# Patient Record
Sex: FEMALE | Race: WHITE | Employment: OTHER | URBAN - METROPOLITAN AREA
[De-identification: names, ages, dates, MRNs, and addresses within clinical notes are randomized per-mention and may not be internally consistent; named-entity substitution may affect disease eponyms.]

---

## 2020-01-09 LAB
CHOLESTEROL, TOTAL: NORMAL
CHOLESTEROL/HDL RATIO: NORMAL
HDLC SERPL-MCNC: NORMAL MG/DL
LDL CHOLESTEROL CALCULATED: NORMAL
NONHDLC SERPL-MCNC: NORMAL MG/DL
TRIGL SERPL-MCNC: NORMAL MG/DL
VLDLC SERPL CALC-MCNC: NORMAL MG/DL

## 2020-01-16 ENCOUNTER — OFFICE VISIT (OUTPATIENT)
Dept: SURGERY | Age: 65
End: 2020-01-16
Payer: MEDICARE

## 2020-01-16 ENCOUNTER — PREP FOR PROCEDURE (OUTPATIENT)
Dept: SURGERY | Age: 65
End: 2020-01-16

## 2020-01-16 VITALS
OXYGEN SATURATION: 95 % | DIASTOLIC BLOOD PRESSURE: 86 MMHG | HEIGHT: 61 IN | TEMPERATURE: 97.6 F | SYSTOLIC BLOOD PRESSURE: 137 MMHG | WEIGHT: 205 LBS | BODY MASS INDEX: 38.71 KG/M2 | HEART RATE: 72 BPM | RESPIRATION RATE: 20 BRPM

## 2020-01-16 PROCEDURE — 1090F PRES/ABSN URINE INCON ASSESS: CPT | Performed by: SURGERY

## 2020-01-16 PROCEDURE — G8484 FLU IMMUNIZE NO ADMIN: HCPCS | Performed by: SURGERY

## 2020-01-16 PROCEDURE — 99214 OFFICE O/P EST MOD 30 MIN: CPT | Performed by: SURGERY

## 2020-01-16 PROCEDURE — G8427 DOCREV CUR MEDS BY ELIG CLIN: HCPCS | Performed by: SURGERY

## 2020-01-16 PROCEDURE — G8417 CALC BMI ABV UP PARAM F/U: HCPCS | Performed by: SURGERY

## 2020-01-16 RX ORDER — INDOCYANINE GREEN AND WATER 25 MG
2.5 KIT INJECTION ONCE
Status: CANCELLED | OUTPATIENT
Start: 2020-01-16 | End: 2020-01-16

## 2020-01-16 RX ORDER — SODIUM CHLORIDE 0.9 % (FLUSH) 0.9 %
10 SYRINGE (ML) INJECTION PRN
Status: CANCELLED | OUTPATIENT
Start: 2020-01-16

## 2020-01-16 RX ORDER — SODIUM CHLORIDE 0.9 % (FLUSH) 0.9 %
10 SYRINGE (ML) INJECTION EVERY 12 HOURS SCHEDULED
Status: CANCELLED | OUTPATIENT
Start: 2020-01-16

## 2020-01-16 SDOH — HEALTH STABILITY: MENTAL HEALTH: HOW OFTEN DO YOU HAVE A DRINK CONTAINING ALCOHOL?: NEVER

## 2020-01-16 NOTE — PATIENT INSTRUCTIONS
General Surgery     Preoperative Instructions    Please read the following information very carefully. It contains information that is necessary to best prepare you for your upcoming procedure. Make arrangements for a  to take you to and from your procedure. Nothing to eat or drink after midnight the night before your procedure. Follow your bowel prep instructions if you have them for this procedure. 3 days prior to your procedure: Stop taking blood thinners like Coumadin or Plavix or Xarelto. 5 days prior to your procedure: Stop taking Aspirin or Aspirin containing products. If you cannot stop any of these medications prior to your procedure, please contact our office. Medications morning of procedure: Only heart, breathing, blood pressure, and seizure medications are permitted on the morning of your procedure. These medications can be taken with a sip of water. IF YOU ARE UNABLE TO KEEP THE ABOVE SCHEDULED PROCEDURE, YOU MUST NOTIFY DR. BUSTOS'S OFFICE [phone]. NOT THE FACILITY. NO CHEWING GUM OR CHEWING TOBACCO AFTER MIDNIGHT ON DAY OF PROCEDURE.    YOU MUST HAVE TRANSPORTATION TO AND FROM THE FACILITY.

## 2020-01-16 NOTE — PROGRESS NOTES
History and Physical - General Surgery    Patient's Name/Date of Birth: Jennifer Ivory / 1955    Date: 1/16/2020    PCP: Abhishek Horton MD    Referring Physician:   Eveline Montejo MD  [phone]      CHIEF COMPLAINT:    Chief Complaint   Patient presents with    Cholelithiasis     referral for gallstones on US         HISTORY OF PRESENT ILLNESS:    Jennifer Ivory is an 75 y.o. female who presents with RUQ pain, nausea after eating. She isn't sure what kinds of foods bother her. She thinks she has had about three attacks. This has been going on for a while. She said since her last attack she has stuck to a bland diet. Her last attack was about a week ago. She said the attack lasted 3-4 hours. No vomiting. She had some diarrhea as well. No cola colored urine. She thinks she has had some lighter stool. She had recent LFTs which were normal. Her two daughters and two of her sisters had gallbladder surgery. Past Medical History:   Past Medical History:   Diagnosis Date    Blood transfusion complicating pregnancy     Phlebitis         Past Surgical History:   Past Surgical History:   Procedure Laterality Date    ECTOPIC PREGNANCY SURGERY  1979    HYSTERECTOMY, VAGINAL      SPLENECTOMY, TOTAL  1979    TOE SURGERY  2000    TONSILLECTOMY          Allergies: Aspirin     Medications:   No current outpatient medications on file. No current facility-administered medications for this visit. Social History:   Social History     Tobacco Use    Smoking status: Never Smoker    Smokeless tobacco: Never Used   Substance Use Topics    Alcohol use: Never     Frequency: Never        Family History:   No family history on file.     REVIEW OF SYSTEMS:    Constitutional: negative  Eyes: negative  Ears, nose, mouth, throat, and face: negative  Respiratory: negative  Cardiovascular: negative  Gastrointestinal: as in HPI  Genitourinary:negative  Integument/breast: negative  Hematologic/lymphatic: negative  Musculoskeletal:negative  Neurological: negative  Allergic/Immunologic: negative    PHYSICAL EXAM   /86 (Site: Left Upper Arm, Position: Sitting, Cuff Size: Medium Adult)   Pulse 72   Temp 97.6 °F (36.4 °C) (Oral)   Resp 20   Ht 5' 1\" (1.549 m)   Wt 205 lb (93 kg)   SpO2 95%   BMI 38.73 kg/m²     General appearance: alert, cooperative and in no acute distress. Eyes: Grossly normal   Lungs: Clear to auscultation bilaterally  Heart: regular rate and rhythm  Abdomen:epigastric tenderness, soft, non distended, no masses or organomegaly   Skin: No skin abnormalities  Neurologic: Alert and oriented x 3. Grossly normal  Musculoskeletal: No clubbing cyanosis or edema. DATA:          ASSESSMENT AND PLAN:       Assessment: Yair Olivia is an 75 y.o. female who presents with cholelithiasis, biliary colic    Plan: Robotic assisted laparoscopic possible open cholecystectomy possible intraoperative cholangiogram  Risks of cholecystectomy were discussed with the patient. These include but are not limited to common bile duct injury, bile leak, liver injury, damage to blood vessels and bleeding, injury to bowel with port placement, as well as infection in the abdomen or of the incisions. I explained all other risks, benefits, alternatives, and potential complications associated with the procedure to be performed and transfusions when applicable with the patient/responsible person prior to the procedure. All of the patient's questions were answered. The patient understands and agrees to surgery.      Physician Signature: Electronically signed by Lea Grant MD, General Surgery    Send copy of H&P to PCP, Jean Pierre Mcleod MD and referring physician, Prasanth Don MD

## 2020-01-22 NOTE — PROGRESS NOTES
Pt has medicare no prior auth is needed for lap aaron.     Electronically signed by Willow Sidhu MA on 1/22/2020 at 12:12 PM

## 2020-01-24 NOTE — PROGRESS NOTES
[facility] PRE-ADMISSION TESTING INSTRUCTIONS    The Preadmission Testing patient is instructed accordingly using the following criteria (check applicable):    ARRIVAL INSTRUCTIONS:  [x] Parking the day of Surgery is located in the Main Entrance lot. Upon entering the door, make an immediate right to the surgery reception desk    [x] Bring photo ID and insurance card    [] Bring in a copy of Living will or Durable Power of  papers. [x] Please be sure to arrange for responsible adult to provide transportation to and from the hospital    [x] Please arrange for responsible adult to be with you for the 24 hour period post procedure due to having anesthesia      GENERAL INSTRUCTIONS:    [x] Nothing by mouth after midnight, including gum, candy, mints or water    [x] You may brush your teeth, but do not swallow any water    [] Take medications as instructed with 1-2 oz of water    [x] Stop herbal supplements and vitamins 5 days prior to procedure    [x] Follow preop dosing of blood thinners per physician instructions    [] Take 1/2 dose of evening insulin, but no insulin after midnight    [] No oral diabetic medications after midnight    [] If diabetic and have low blood sugar or feel symptomatic, take 1-2oz apple juice only    [] Bring inhalers day of surgery    [] Bring C-PAP/ Bi-Pap day of surgery    [] Bring urine specimen day of surgery    [x] Shower or bath with soap, lather and rinse well, AM of Surgery, no lotion, powders or creams to surgical site    [] Follow bowel prep as instructed per surgeon    [x] No tobacco products within 24 hours of surgery     [x] No alcohol or illegal drug use within 24 hours of surgery.     [x] Jewelry, body piercing's, eyeglasses, contact lenses and dentures are not permitted into surgery (bring cases)      [x] Please do not wear any nail polish, make up or hair products on the day of surgery    [x] If not already done, you can expect a call from registration    [x] You can expect a call the business day prior to procedure to notify you if your arrival time changes    [x] If you receive a survey after surgery we would greatly appreciate your comments    [] Parent/guardian of a minor must accompany their child and remain on the premises  the entire time they are under our care     [] Pediatric patients may bring favorite toy, blanket or comfort item with them    [] A caregiver or family member must remain with the patient during their stay if they are mentally handicapped, have dementia, disoriented or unable to use a call light or would be a safety concern if left unattended    [x] Please notify surgeon if you develop any illness between now and time of surgery (cold, cough, sore throat, fever, nausea, vomiting) or any signs of infections  including skin, wounds, and dental.    [x]  The Outpatient Pharmacy is available to fill your prescription here on your day of surgery, ask your preop nurse for details    [] Other instructions  EDUCATIONAL MATERIALS PROVIDED:    [] PAT Preoperative Education Packet/Booklet     [] Medication List    [] Fluoroscopy Information Pamphlet    [] Transfusion bracelet applied with instructions    [] Joint replacement video reviewed    [] Shower with soap, lather and rinse well, and use CHG wipes provided the evening before surgery as instructed

## 2020-02-04 ENCOUNTER — HOSPITAL ENCOUNTER (OUTPATIENT)
Age: 65
Setting detail: OUTPATIENT SURGERY
Discharge: HOME | End: 2020-02-04
Attending: SURGERY | Admitting: SURGERY
Payer: MEDICARE

## 2020-02-04 ENCOUNTER — ANESTHESIA EVENT (OUTPATIENT)
Dept: OPERATING ROOM | Age: 65
End: 2020-02-04
Payer: MEDICARE

## 2020-02-04 ENCOUNTER — ANESTHESIA (OUTPATIENT)
Dept: OPERATING ROOM | Age: 65
End: 2020-02-04
Payer: MEDICARE

## 2020-02-04 VITALS
DIASTOLIC BLOOD PRESSURE: 67 MMHG | HEART RATE: 61 BPM | RESPIRATION RATE: 16 BRPM | WEIGHT: 205 LBS | HEIGHT: 61 IN | BODY MASS INDEX: 38.71 KG/M2 | OXYGEN SATURATION: 94 % | SYSTOLIC BLOOD PRESSURE: 141 MMHG | TEMPERATURE: 97.7 F

## 2020-02-04 VITALS
SYSTOLIC BLOOD PRESSURE: 171 MMHG | DIASTOLIC BLOOD PRESSURE: 99 MMHG | OXYGEN SATURATION: 100 % | RESPIRATION RATE: 10 BRPM | TEMPERATURE: 96.1 F

## 2020-02-04 LAB
ALBUMIN SERPL-MCNC: 3.7 G/DL (ref 3.5–5.2)
ALP BLD-CCNC: 80 U/L (ref 35–104)
ALT SERPL-CCNC: 20 U/L (ref 0–32)
ANION GAP SERPL CALCULATED.3IONS-SCNC: 8 MMOL/L (ref 7–16)
AST SERPL-CCNC: 26 U/L (ref 0–31)
BILIRUB SERPL-MCNC: 0.6 MG/DL (ref 0–1.2)
BUN BLDV-MCNC: 7 MG/DL (ref 8–23)
CALCIUM SERPL-MCNC: 9.3 MG/DL (ref 8.6–10.2)
CHLORIDE BLD-SCNC: 106 MMOL/L (ref 98–107)
CO2: 24 MMOL/L (ref 22–29)
CREAT SERPL-MCNC: 0.8 MG/DL (ref 0.5–1)
GFR AFRICAN AMERICAN: >60
GFR NON-AFRICAN AMERICAN: >60 ML/MIN/1.73
GLUCOSE BLD-MCNC: 96 MG/DL (ref 74–99)
POTASSIUM REFLEX MAGNESIUM: 4.5 MMOL/L (ref 3.5–5)
SODIUM BLD-SCNC: 138 MMOL/L (ref 132–146)
TOTAL PROTEIN: 7.5 G/DL (ref 6.4–8.3)

## 2020-02-04 PROCEDURE — 2500000003 HC RX 250 WO HCPCS: Performed by: NURSE ANESTHETIST, CERTIFIED REGISTERED

## 2020-02-04 PROCEDURE — 80053 COMPREHEN METABOLIC PANEL: CPT

## 2020-02-04 PROCEDURE — 88304 TISSUE EXAM BY PATHOLOGIST: CPT

## 2020-02-04 PROCEDURE — 2709999900 HC NON-CHARGEABLE SUPPLY: Performed by: SURGERY

## 2020-02-04 PROCEDURE — 6360000002 HC RX W HCPCS: Performed by: NURSE ANESTHETIST, CERTIFIED REGISTERED

## 2020-02-04 PROCEDURE — 7100000010 HC PHASE II RECOVERY - FIRST 15 MIN: Performed by: SURGERY

## 2020-02-04 PROCEDURE — 6360000002 HC RX W HCPCS: Performed by: SURGERY

## 2020-02-04 PROCEDURE — 2500000003 HC RX 250 WO HCPCS: Performed by: SURGERY

## 2020-02-04 PROCEDURE — 7100000001 HC PACU RECOVERY - ADDTL 15 MIN: Performed by: SURGERY

## 2020-02-04 PROCEDURE — 7100000011 HC PHASE II RECOVERY - ADDTL 15 MIN: Performed by: SURGERY

## 2020-02-04 PROCEDURE — 3600000019 HC SURGERY ROBOT ADDTL 15MIN: Performed by: SURGERY

## 2020-02-04 PROCEDURE — 3600000009 HC SURGERY ROBOT BASE: Performed by: SURGERY

## 2020-02-04 PROCEDURE — 3700000000 HC ANESTHESIA ATTENDED CARE: Performed by: SURGERY

## 2020-02-04 PROCEDURE — 3700000001 HC ADD 15 MINUTES (ANESTHESIA): Performed by: SURGERY

## 2020-02-04 PROCEDURE — S2900 ROBOTIC SURGICAL SYSTEM: HCPCS | Performed by: SURGERY

## 2020-02-04 PROCEDURE — 47562 LAPAROSCOPIC CHOLECYSTECTOMY: CPT | Performed by: SURGERY

## 2020-02-04 PROCEDURE — 7100000000 HC PACU RECOVERY - FIRST 15 MIN: Performed by: SURGERY

## 2020-02-04 PROCEDURE — 2580000003 HC RX 258: Performed by: NURSE ANESTHETIST, CERTIFIED REGISTERED

## 2020-02-04 PROCEDURE — 36415 COLL VENOUS BLD VENIPUNCTURE: CPT

## 2020-02-04 PROCEDURE — 6360000002 HC RX W HCPCS: Performed by: ANESTHESIOLOGY

## 2020-02-04 RX ORDER — INDOCYANINE GREEN AND WATER 25 MG
2.5 KIT INJECTION ONCE
Status: COMPLETED | OUTPATIENT
Start: 2020-02-04 | End: 2020-02-04

## 2020-02-04 RX ORDER — OXYCODONE HYDROCHLORIDE AND ACETAMINOPHEN 5; 325 MG/1; MG/1
1 TABLET ORAL
Status: DISCONTINUED | OUTPATIENT
Start: 2020-02-04 | End: 2020-02-04 | Stop reason: HOSPADM

## 2020-02-04 RX ORDER — PROMETHAZINE HYDROCHLORIDE 25 MG/ML
6.25 INJECTION, SOLUTION INTRAMUSCULAR; INTRAVENOUS PRN
Status: DISCONTINUED | OUTPATIENT
Start: 2020-02-04 | End: 2020-02-04 | Stop reason: HOSPADM

## 2020-02-04 RX ORDER — ROCURONIUM BROMIDE 10 MG/ML
INJECTION, SOLUTION INTRAVENOUS PRN
Status: DISCONTINUED | OUTPATIENT
Start: 2020-02-04 | End: 2020-02-04 | Stop reason: SDUPTHER

## 2020-02-04 RX ORDER — PROPOFOL 10 MG/ML
INJECTION, EMULSION INTRAVENOUS PRN
Status: DISCONTINUED | OUTPATIENT
Start: 2020-02-04 | End: 2020-02-04 | Stop reason: SDUPTHER

## 2020-02-04 RX ORDER — MIDAZOLAM HYDROCHLORIDE 1 MG/ML
INJECTION INTRAMUSCULAR; INTRAVENOUS PRN
Status: DISCONTINUED | OUTPATIENT
Start: 2020-02-04 | End: 2020-02-04 | Stop reason: SDUPTHER

## 2020-02-04 RX ORDER — MORPHINE SULFATE 4 MG/ML
4 INJECTION, SOLUTION INTRAMUSCULAR; INTRAVENOUS EVERY 5 MIN PRN
Status: COMPLETED | OUTPATIENT
Start: 2020-02-04 | End: 2020-02-04

## 2020-02-04 RX ORDER — SODIUM CHLORIDE 9 MG/ML
INJECTION, SOLUTION INTRAVENOUS CONTINUOUS PRN
Status: DISCONTINUED | OUTPATIENT
Start: 2020-02-04 | End: 2020-02-04 | Stop reason: SDUPTHER

## 2020-02-04 RX ORDER — DEXAMETHASONE SODIUM PHOSPHATE 4 MG/ML
INJECTION, SOLUTION INTRA-ARTICULAR; INTRALESIONAL; INTRAMUSCULAR; INTRAVENOUS; SOFT TISSUE PRN
Status: DISCONTINUED | OUTPATIENT
Start: 2020-02-04 | End: 2020-02-04 | Stop reason: SDUPTHER

## 2020-02-04 RX ORDER — SODIUM CHLORIDE 0.9 % (FLUSH) 0.9 %
10 SYRINGE (ML) INJECTION PRN
Status: DISCONTINUED | OUTPATIENT
Start: 2020-02-04 | End: 2020-02-04 | Stop reason: HOSPADM

## 2020-02-04 RX ORDER — SODIUM CHLORIDE 0.9 % (FLUSH) 0.9 %
10 SYRINGE (ML) INJECTION EVERY 12 HOURS SCHEDULED
Status: DISCONTINUED | OUTPATIENT
Start: 2020-02-04 | End: 2020-02-04 | Stop reason: HOSPADM

## 2020-02-04 RX ORDER — FENTANYL CITRATE 50 UG/ML
INJECTION, SOLUTION INTRAMUSCULAR; INTRAVENOUS PRN
Status: DISCONTINUED | OUTPATIENT
Start: 2020-02-04 | End: 2020-02-04 | Stop reason: SDUPTHER

## 2020-02-04 RX ORDER — ONDANSETRON 2 MG/ML
INJECTION INTRAMUSCULAR; INTRAVENOUS PRN
Status: DISCONTINUED | OUTPATIENT
Start: 2020-02-04 | End: 2020-02-04 | Stop reason: SDUPTHER

## 2020-02-04 RX ORDER — MEPERIDINE HYDROCHLORIDE 25 MG/ML
12.5 INJECTION INTRAMUSCULAR; INTRAVENOUS; SUBCUTANEOUS EVERY 10 MIN PRN
Status: DISCONTINUED | OUTPATIENT
Start: 2020-02-04 | End: 2020-02-04 | Stop reason: HOSPADM

## 2020-02-04 RX ORDER — CEFAZOLIN SODIUM 2 G/50ML
2 SOLUTION INTRAVENOUS
Status: COMPLETED | OUTPATIENT
Start: 2020-02-04 | End: 2020-02-04

## 2020-02-04 RX ORDER — HYDROCODONE BITARTRATE AND ACETAMINOPHEN 5; 325 MG/1; MG/1
1 TABLET ORAL EVERY 4 HOURS PRN
Qty: 18 TABLET | Refills: 0 | Status: SHIPPED | OUTPATIENT
Start: 2020-02-04 | End: 2020-02-07

## 2020-02-04 RX ORDER — LIDOCAINE HYDROCHLORIDE 20 MG/ML
INJECTION, SOLUTION EPIDURAL; INFILTRATION; INTRACAUDAL; PERINEURAL PRN
Status: DISCONTINUED | OUTPATIENT
Start: 2020-02-04 | End: 2020-02-04 | Stop reason: SDUPTHER

## 2020-02-04 RX ADMIN — FENTANYL CITRATE 25 MCG: 50 INJECTION, SOLUTION INTRAMUSCULAR; INTRAVENOUS at 09:28

## 2020-02-04 RX ADMIN — ROCURONIUM BROMIDE 50 MG: 10 SOLUTION INTRAVENOUS at 08:09

## 2020-02-04 RX ADMIN — LIDOCAINE HYDROCHLORIDE 100 MG: 20 INJECTION, SOLUTION EPIDURAL; INFILTRATION; INTRACAUDAL; PERINEURAL at 08:08

## 2020-02-04 RX ADMIN — SODIUM CHLORIDE: 9 INJECTION, SOLUTION INTRAVENOUS at 09:24

## 2020-02-04 RX ADMIN — ONDANSETRON HYDROCHLORIDE 4 MG: 2 INJECTION, SOLUTION INTRAMUSCULAR; INTRAVENOUS at 09:10

## 2020-02-04 RX ADMIN — MIDAZOLAM 2 MG: 1 INJECTION INTRAMUSCULAR; INTRAVENOUS at 07:54

## 2020-02-04 RX ADMIN — SODIUM CHLORIDE: 9 INJECTION, SOLUTION INTRAVENOUS at 07:54

## 2020-02-04 RX ADMIN — INDOCYANINE GREEN AND WATER 2.5 MG: KIT at 07:08

## 2020-02-04 RX ADMIN — FENTANYL CITRATE 100 MCG: 50 INJECTION, SOLUTION INTRAMUSCULAR; INTRAVENOUS at 08:08

## 2020-02-04 RX ADMIN — PROMETHAZINE HYDROCHLORIDE 6.25 MG: 25 INJECTION INTRAMUSCULAR; INTRAVENOUS at 10:06

## 2020-02-04 RX ADMIN — PROPOFOL 200 MG: 10 INJECTION, EMULSION INTRAVENOUS at 08:08

## 2020-02-04 RX ADMIN — DEXAMETHASONE SODIUM PHOSPHATE 10 MG: 4 INJECTION, SOLUTION INTRAMUSCULAR; INTRAVENOUS at 08:15

## 2020-02-04 RX ADMIN — HYDROMORPHONE HYDROCHLORIDE 0.5 MG: 1 INJECTION, SOLUTION INTRAMUSCULAR; INTRAVENOUS; SUBCUTANEOUS at 09:52

## 2020-02-04 RX ADMIN — MORPHINE SULFATE 4 MG: 4 INJECTION, SOLUTION INTRAMUSCULAR; INTRAVENOUS at 09:41

## 2020-02-04 RX ADMIN — SUGAMMADEX 372 MG: 100 INJECTION, SOLUTION INTRAVENOUS at 09:14

## 2020-02-04 RX ADMIN — FENTANYL CITRATE 25 MCG: 50 INJECTION, SOLUTION INTRAMUSCULAR; INTRAVENOUS at 08:56

## 2020-02-04 RX ADMIN — FENTANYL CITRATE 25 MCG: 50 INJECTION, SOLUTION INTRAMUSCULAR; INTRAVENOUS at 09:20

## 2020-02-04 RX ADMIN — FENTANYL CITRATE 50 MCG: 50 INJECTION, SOLUTION INTRAMUSCULAR; INTRAVENOUS at 08:32

## 2020-02-04 RX ADMIN — FENTANYL CITRATE 25 MCG: 50 INJECTION, SOLUTION INTRAMUSCULAR; INTRAVENOUS at 09:04

## 2020-02-04 RX ADMIN — CEFAZOLIN SODIUM 2 G: 2 SOLUTION INTRAVENOUS at 07:54

## 2020-02-04 ASSESSMENT — PULMONARY FUNCTION TESTS
PIF_VALUE: 1
PIF_VALUE: 20
PIF_VALUE: 0
PIF_VALUE: 0
PIF_VALUE: 10
PIF_VALUE: 0
PIF_VALUE: 35
PIF_VALUE: 29
PIF_VALUE: 36
PIF_VALUE: 0
PIF_VALUE: 35
PIF_VALUE: 34
PIF_VALUE: 0
PIF_VALUE: 28
PIF_VALUE: 34
PIF_VALUE: 25
PIF_VALUE: 29
PIF_VALUE: 30
PIF_VALUE: 35
PIF_VALUE: 17
PIF_VALUE: 0
PIF_VALUE: 33
PIF_VALUE: 0
PIF_VALUE: 27
PIF_VALUE: 25
PIF_VALUE: 0
PIF_VALUE: 35
PIF_VALUE: 0
PIF_VALUE: 33
PIF_VALUE: 34
PIF_VALUE: 0
PIF_VALUE: 34
PIF_VALUE: 0
PIF_VALUE: 35
PIF_VALUE: 27
PIF_VALUE: 33
PIF_VALUE: 40
PIF_VALUE: 36
PIF_VALUE: 33
PIF_VALUE: 34
PIF_VALUE: 33
PIF_VALUE: 0
PIF_VALUE: 0
PIF_VALUE: 1
PIF_VALUE: 0
PIF_VALUE: 34
PIF_VALUE: 0
PIF_VALUE: 35
PIF_VALUE: 0
PIF_VALUE: 25
PIF_VALUE: 36
PIF_VALUE: 0
PIF_VALUE: 26
PIF_VALUE: 1
PIF_VALUE: 35
PIF_VALUE: 35
PIF_VALUE: 28
PIF_VALUE: 35
PIF_VALUE: 33
PIF_VALUE: 18
PIF_VALUE: 0
PIF_VALUE: 34
PIF_VALUE: 32
PIF_VALUE: 0
PIF_VALUE: 35
PIF_VALUE: 0
PIF_VALUE: 0
PIF_VALUE: 25
PIF_VALUE: 0
PIF_VALUE: 33
PIF_VALUE: 24
PIF_VALUE: 35
PIF_VALUE: 19
PIF_VALUE: 0
PIF_VALUE: 34
PIF_VALUE: 32
PIF_VALUE: 0
PIF_VALUE: 0
PIF_VALUE: 1
PIF_VALUE: 35
PIF_VALUE: 25
PIF_VALUE: 35
PIF_VALUE: 0
PIF_VALUE: 33
PIF_VALUE: 0
PIF_VALUE: 32
PIF_VALUE: 0
PIF_VALUE: 0
PIF_VALUE: 34
PIF_VALUE: 26
PIF_VALUE: 31
PIF_VALUE: 1
PIF_VALUE: 25
PIF_VALUE: 0
PIF_VALUE: 0
PIF_VALUE: 28
PIF_VALUE: 0
PIF_VALUE: 36
PIF_VALUE: 18
PIF_VALUE: 28
PIF_VALUE: 1

## 2020-02-04 ASSESSMENT — PAIN DESCRIPTION - ORIENTATION
ORIENTATION: RIGHT;MID
ORIENTATION: LOWER;MID
ORIENTATION: MID;LOWER

## 2020-02-04 ASSESSMENT — PAIN DESCRIPTION - PAIN TYPE
TYPE: SURGICAL PAIN

## 2020-02-04 ASSESSMENT — PAIN DESCRIPTION - FREQUENCY
FREQUENCY: CONTINUOUS

## 2020-02-04 ASSESSMENT — PAIN SCALES - GENERAL
PAINLEVEL_OUTOF10: 8

## 2020-02-04 ASSESSMENT — PAIN DESCRIPTION - LOCATION
LOCATION: ABDOMEN

## 2020-02-04 ASSESSMENT — PAIN DESCRIPTION - DESCRIPTORS
DESCRIPTORS: ACHING;BURNING
DESCRIPTORS: ACHING;BURNING;DISCOMFORT
DESCRIPTORS: BURNING;ACHING
DESCRIPTORS: ACHING;BURNING
DESCRIPTORS: ACHING
DESCRIPTORS: BURNING

## 2020-02-04 ASSESSMENT — PAIN DESCRIPTION - PROGRESSION
CLINICAL_PROGRESSION: NOT CHANGED

## 2020-02-04 NOTE — PROGRESS NOTES
After being medicated for pain, patient fell asleep. Oxygen saturation dropped momentarily. Pt aroused to voice and deep breathing encouraged. Pt immediately improved with breathing exercises.

## 2020-02-04 NOTE — OP NOTE
Operative Note - Robotic Assisted Laparoscopic Cholecystectomy    Antoinette Fletcher     DATE OF PROCEDURE: 2/4/2020    SURGEON: Surgeon(s):  Helen Issa MD    PREOPERATIVE DIAGNOSIS: Biliary colic, gallstones    POSTOPERATIVE DIAGNOSIS: Acute cholecystitis     OPERATION: Robotic Assisted Laparoscopic cholecystectomy. ANESTHESIA: General endotracheal.     ESTIMATED BLOOD LOSS: less than 50ml    SPECIMEN: Gallbladder    COMPLICATIONS: None. BRIEF HISTORY: Antoinette Fletcher is a 75 y.o.  female who presented with RUQ pain. I discussed the procedure with the patient, including the procedure, benefits, risks, and alternatives. She agreed. ICG was given in preoperative holding prior to the procedure. PROCEDURE: The patient was taken to the operative suite and was placed on the table in the supine position. General endotracheal anesthesia was administered. An orogastric tube was placed to decompress the stomach. The abdomen was then prepped with Chloraprep and draped in a sterile fashion. An 8 mm periumbilical incision was made with an 11-blade scalpel, and then while tenting the abdominal wall upward, a Veress needle was easily inserted through the abdominal cavity. After confirmation of its placement using the saline drop test, a total of approximately 3 L of carbon dioxide was insufflated, creating a pneumoperitoneum. The Veress needle was removed, and an 8 mm trocar was inserted while tenting the abdominal wall upward. A prepared laparoscope was inserted, and the right upper quadrant was visualized. An 8-mm port was placed in the left upper quadrant, another two 8 mm ports were placed in the RLQ. There was no injury from port placement. The robot was then placed over the patient and the ports docked. I then broke scrub and began the case at the surgeon console. The robotic scope was introduced into the abdomen and two Cadiere graspers were placed in arms 1 and 2 under direct vision. allowed to escape. All skin incisions were irrigated with saline and dried, and any bleeding points were cauterized. All skin incisions were closed with 4-0 Monocryl subcuticular sutures. Skin glue was placed over all incisions. The patient tolerated the procedure well. Sponge, needle, and instrument counts were correct. The orogastric tube was replaced, and the patient was extubated and sent to the postanesthesia care unit in stable condition.      Lisa Spence MD, FACS 2/4/2020 9:14 AM    Send copy of H&P to PCP, Karen Perry MD and referring physician, Deidra Cabot, MD

## 2020-02-04 NOTE — ANESTHESIA PRE PROCEDURE
Department of Anesthesiology  Preprocedure Note       Name:  Loreli Closs   Age:  75 y.o.  :  1955                                          MRN:  [id number]         Date:  2020      Surgeon: Itzel Hernandez):  Lety Laura MD    Procedure: LAPAROSCOPIC ROBOTIC XI ASSISTED CHOLECYSTECTOMY POSSIBLE OPEN POSSIBLE GRAM    ++LATEX ALLERGY++ (N/A )    Medications prior to admission:   Prior to Admission medications    Not on File       Current medications:    Current Facility-Administered Medications   Medication Dose Route Frequency Provider Last Rate Last Dose    ceFAZolin (ANCEF) 2 g in dextrose 3 % 50 mL IVPB (duplex)  2 g Intravenous On Call to Félix Concepcion MD        sodium chloride flush 0.9 % injection 10 mL  10 mL Intravenous 2 times per day Lety Laura MD        sodium chloride flush 0.9 % injection 10 mL  10 mL Intravenous PRN Lety Laura MD           Allergies: Allergies   Allergen Reactions    Latex     Aspirin Nausea And Vomiting and Other (See Comments)     Stomach pain       Problem List:  There is no problem list on file for this patient.       Past Medical History:        Diagnosis Date    Cyst of left kidney     x 2    Diverticulitis     Gallbladder problem 2020    History of blood transfusion 40 years ago    Phlebitis 40 years ago    while hospitalized    PONV (postoperative nausea and vomiting)        Past Surgical History:        Procedure Laterality Date    COLONOSCOPY     [address], VAGINAL  2012    SPLENECTOMY, TOTAL  1979    TOE SURGERY      TONSILLECTOMY         Social History:    Social History     Tobacco Use    Smoking status: Never Smoker    Smokeless tobacco: Never Used   Substance Use Topics    Alcohol use: Never     Frequency: Never                                Counseling given: Not Answered      Vital Signs (Current):   Vitals:    20 1529 20 0631   BP:

## 2020-02-04 NOTE — H&P
clubbing cyanosis or edema. DATA:          ASSESSMENT AND PLAN:       Assessment: Suzanne Moritz is an 75 y.o. female who presents with cholelithiasis, biliary colic    Plan: Robotic assisted laparoscopic possible open cholecystectomy possible intraoperative cholangiogram  Risks of cholecystectomy were discussed with the patient. These include but are not limited to common bile duct injury, bile leak, liver injury, damage to blood vessels and bleeding, injury to bowel with port placement, as well as infection in the abdomen or of the incisions. I explained all other risks, benefits, alternatives, and potential complications associated with the procedure to be performed and transfusions when applicable with the patient/responsible person prior to the procedure. All of the patient's questions were answered. The patient understands and agrees to surgery.      Physician Signature: Electronically signed by Bren Hinson MD, General Surgery    Send copy of H&P to PCP, Elizabeth Hassan MD and referring physician, Bar Serna MD

## 2020-02-17 ENCOUNTER — TELEPHONE (OUTPATIENT)
Dept: SURGERY | Age: 65
End: 2020-02-17

## 2020-02-17 ENCOUNTER — OFFICE VISIT (OUTPATIENT)
Dept: SURGERY | Age: 65
End: 2020-02-17

## 2020-02-17 VITALS
HEART RATE: 81 BPM | SYSTOLIC BLOOD PRESSURE: 128 MMHG | WEIGHT: 204 LBS | BODY MASS INDEX: 38.51 KG/M2 | DIASTOLIC BLOOD PRESSURE: 80 MMHG | RESPIRATION RATE: 20 BRPM | HEIGHT: 61 IN | TEMPERATURE: 97.8 F | OXYGEN SATURATION: 94 %

## 2020-02-17 PROCEDURE — 99024 POSTOP FOLLOW-UP VISIT: CPT | Performed by: SURGERY

## 2021-01-28 ENCOUNTER — APPOINTMENT (OUTPATIENT)
Dept: CT IMAGING | Age: 66
DRG: 280 | End: 2021-01-28
Payer: MEDICARE

## 2021-01-28 ENCOUNTER — HOSPITAL ENCOUNTER (INPATIENT)
Age: 66
LOS: 1 days | Discharge: ANOTHER HOSPITAL | DRG: 280 | End: 2021-01-29
Attending: EMERGENCY MEDICINE | Admitting: INTERNAL MEDICINE
Payer: MEDICARE

## 2021-01-28 DIAGNOSIS — I21.4 NSTEMI (NON-ST ELEVATED MYOCARDIAL INFARCTION) (HCC): Primary | ICD-10-CM

## 2021-01-28 LAB
ALBUMIN SERPL-MCNC: 3.7 G/DL (ref 3.5–5.2)
ALP BLD-CCNC: 107 U/L (ref 35–104)
ALT SERPL-CCNC: 25 U/L (ref 0–32)
ANION GAP SERPL CALCULATED.3IONS-SCNC: 9 MMOL/L (ref 7–16)
APTT: 30.1 SEC (ref 24.5–35.1)
AST SERPL-CCNC: 47 U/L (ref 0–31)
BASOPHILS ABSOLUTE: 0.05 E9/L (ref 0–0.2)
BASOPHILS RELATIVE PERCENT: 0.5 % (ref 0–2)
BILIRUB SERPL-MCNC: 0.4 MG/DL (ref 0–1.2)
BUN BLDV-MCNC: 8 MG/DL (ref 8–23)
CALCIUM SERPL-MCNC: 9.2 MG/DL (ref 8.6–10.2)
CHLORIDE BLD-SCNC: 106 MMOL/L (ref 98–107)
CO2: 26 MMOL/L (ref 22–29)
CREAT SERPL-MCNC: 0.7 MG/DL (ref 0.5–1)
EOSINOPHILS ABSOLUTE: 0.02 E9/L (ref 0.05–0.5)
EOSINOPHILS RELATIVE PERCENT: 0.2 % (ref 0–6)
GFR AFRICAN AMERICAN: >60
GFR NON-AFRICAN AMERICAN: >60 ML/MIN/1.73
GLUCOSE BLD-MCNC: 98 MG/DL (ref 74–99)
HCT VFR BLD CALC: 45.5 % (ref 34–48)
HEMOGLOBIN: 15.1 G/DL (ref 11.5–15.5)
IMMATURE GRANULOCYTES #: 0.04 E9/L
IMMATURE GRANULOCYTES %: 0.4 % (ref 0–5)
INR BLD: 1
LACTIC ACID: 1.5 MMOL/L (ref 0.5–2.2)
LIPASE: 29 U/L (ref 13–60)
LYMPHOCYTES ABSOLUTE: 2.77 E9/L (ref 1.5–4)
LYMPHOCYTES RELATIVE PERCENT: 26 % (ref 20–42)
MCH RBC QN AUTO: 30.6 PG (ref 26–35)
MCHC RBC AUTO-ENTMCNC: 33.2 % (ref 32–34.5)
MCV RBC AUTO: 92.1 FL (ref 80–99.9)
MONOCYTES ABSOLUTE: 1.17 E9/L (ref 0.1–0.95)
MONOCYTES RELATIVE PERCENT: 11 % (ref 2–12)
NEUTROPHILS ABSOLUTE: 6.61 E9/L (ref 1.8–7.3)
NEUTROPHILS RELATIVE PERCENT: 61.9 % (ref 43–80)
PDW BLD-RTO: 14.5 FL (ref 11.5–15)
PLATELET # BLD: 421 E9/L (ref 130–450)
PMV BLD AUTO: 9.7 FL (ref 7–12)
POTASSIUM REFLEX MAGNESIUM: 4.2 MMOL/L (ref 3.5–5)
PROTHROMBIN TIME: 11.6 SEC (ref 9.3–12.4)
RBC # BLD: 4.94 E12/L (ref 3.5–5.5)
SODIUM BLD-SCNC: 141 MMOL/L (ref 132–146)
TOTAL PROTEIN: 7.4 G/DL (ref 6.4–8.3)
TROPONIN: 0.32 NG/ML (ref 0–0.03)
TROPONIN: 0.62 NG/ML (ref 0–0.03)
WBC # BLD: 10.7 E9/L (ref 4.5–11.5)

## 2021-01-28 PROCEDURE — 85025 COMPLETE CBC W/AUTO DIFF WBC: CPT

## 2021-01-28 PROCEDURE — 36415 COLL VENOUS BLD VENIPUNCTURE: CPT

## 2021-01-28 PROCEDURE — 1200000000 HC SEMI PRIVATE

## 2021-01-28 PROCEDURE — 2580000003 HC RX 258: Performed by: INTERNAL MEDICINE

## 2021-01-28 PROCEDURE — 83690 ASSAY OF LIPASE: CPT

## 2021-01-28 PROCEDURE — 99283 EMERGENCY DEPT VISIT LOW MDM: CPT

## 2021-01-28 PROCEDURE — 85610 PROTHROMBIN TIME: CPT

## 2021-01-28 PROCEDURE — 6360000004 HC RX CONTRAST MEDICATION: Performed by: RADIOLOGY

## 2021-01-28 PROCEDURE — 93005 ELECTROCARDIOGRAM TRACING: CPT | Performed by: INTERNAL MEDICINE

## 2021-01-28 PROCEDURE — 84484 ASSAY OF TROPONIN QUANT: CPT

## 2021-01-28 PROCEDURE — 2580000003 HC RX 258: Performed by: EMERGENCY MEDICINE

## 2021-01-28 PROCEDURE — 71275 CT ANGIOGRAPHY CHEST: CPT

## 2021-01-28 PROCEDURE — 85730 THROMBOPLASTIN TIME PARTIAL: CPT

## 2021-01-28 PROCEDURE — 80053 COMPREHEN METABOLIC PANEL: CPT

## 2021-01-28 PROCEDURE — 6370000000 HC RX 637 (ALT 250 FOR IP): Performed by: EMERGENCY MEDICINE

## 2021-01-28 PROCEDURE — 6360000002 HC RX W HCPCS: Performed by: EMERGENCY MEDICINE

## 2021-01-28 PROCEDURE — 83605 ASSAY OF LACTIC ACID: CPT

## 2021-01-28 RX ORDER — SODIUM CHLORIDE 0.9 % (FLUSH) 0.9 %
10 SYRINGE (ML) INJECTION EVERY 12 HOURS SCHEDULED
Status: DISCONTINUED | OUTPATIENT
Start: 2021-01-28 | End: 2021-01-29 | Stop reason: HOSPADM

## 2021-01-28 RX ORDER — ASPIRIN 81 MG/1
324 TABLET, CHEWABLE ORAL ONCE
Status: COMPLETED | OUTPATIENT
Start: 2021-01-28 | End: 2021-01-28

## 2021-01-28 RX ORDER — 0.9 % SODIUM CHLORIDE 0.9 %
1000 INTRAVENOUS SOLUTION INTRAVENOUS ONCE
Status: COMPLETED | OUTPATIENT
Start: 2021-01-28 | End: 2021-01-28

## 2021-01-28 RX ORDER — ONDANSETRON 2 MG/ML
4 INJECTION INTRAMUSCULAR; INTRAVENOUS ONCE
Status: COMPLETED | OUTPATIENT
Start: 2021-01-28 | End: 2021-01-28

## 2021-01-28 RX ORDER — ACETAMINOPHEN 650 MG/1
650 SUPPOSITORY RECTAL EVERY 6 HOURS PRN
Status: DISCONTINUED | OUTPATIENT
Start: 2021-01-28 | End: 2021-01-29 | Stop reason: HOSPADM

## 2021-01-28 RX ORDER — POLYETHYLENE GLYCOL 3350 17 G/17G
17 POWDER, FOR SOLUTION ORAL DAILY PRN
Status: DISCONTINUED | OUTPATIENT
Start: 2021-01-28 | End: 2021-01-29 | Stop reason: HOSPADM

## 2021-01-28 RX ORDER — NITROGLYCERIN 0.4 MG/1
0.4 TABLET SUBLINGUAL EVERY 5 MIN PRN
Status: DISCONTINUED | OUTPATIENT
Start: 2021-01-28 | End: 2021-01-29 | Stop reason: HOSPADM

## 2021-01-28 RX ORDER — ACETAMINOPHEN 325 MG/1
650 TABLET ORAL EVERY 6 HOURS PRN
Status: DISCONTINUED | OUTPATIENT
Start: 2021-01-28 | End: 2021-01-29 | Stop reason: HOSPADM

## 2021-01-28 RX ORDER — SODIUM CHLORIDE 0.9 % (FLUSH) 0.9 %
10 SYRINGE (ML) INJECTION PRN
Status: DISCONTINUED | OUTPATIENT
Start: 2021-01-28 | End: 2021-01-29 | Stop reason: HOSPADM

## 2021-01-28 RX ADMIN — ENOXAPARIN SODIUM 90 MG: 100 INJECTION SUBCUTANEOUS at 19:28

## 2021-01-28 RX ADMIN — ASPIRIN 324 MG: 81 TABLET, CHEWABLE ORAL at 19:29

## 2021-01-28 RX ADMIN — ONDANSETRON 4 MG: 2 INJECTION INTRAMUSCULAR; INTRAVENOUS at 19:26

## 2021-01-28 RX ADMIN — IOPAMIDOL 75 ML: 755 INJECTION, SOLUTION INTRAVENOUS at 17:58

## 2021-01-28 RX ADMIN — SODIUM CHLORIDE 1000 ML: 9 INJECTION, SOLUTION INTRAVENOUS at 17:21

## 2021-01-28 RX ADMIN — METOPROLOL TARTRATE 25 MG: 25 TABLET, FILM COATED ORAL at 19:29

## 2021-01-28 RX ADMIN — Medication 10 ML: at 22:55

## 2021-01-28 RX ADMIN — LIDOCAINE HYDROCHLORIDE: 20 SOLUTION ORAL; TOPICAL at 19:29

## 2021-01-28 ASSESSMENT — ENCOUNTER SYMPTOMS
VOMITING: 0
SHORTNESS OF BREATH: 0
COUGH: 0
BACK PAIN: 0
NAUSEA: 0
ABDOMINAL DISTENTION: 0
BLOOD IN STOOL: 0
CONSTIPATION: 0
EYE REDNESS: 0
RHINORRHEA: 0
WHEEZING: 0
ABDOMINAL PAIN: 0
DIARRHEA: 0
SORE THROAT: 0

## 2021-01-28 NOTE — ED NOTES
EKG completed    Cardiac monitoring and continuous SpO2     Ami B [facility]  01/28/21 1145

## 2021-01-28 NOTE — Clinical Note
Patient Class: Inpatient [101]   REQUIRED: Diagnosis: NSTEMI (non-ST elevated myocardial infarction) [facility]) [196157]   Estimated Length of Stay: Estimated stay of more than 2 midnights   Admitting Provider: Lew Case [[id number]]

## 2021-01-29 ENCOUNTER — HOSPITAL ENCOUNTER (INPATIENT)
Age: 66
LOS: 1 days | Discharge: HOME | DRG: 247 | End: 2021-01-30
Attending: FAMILY MEDICINE | Admitting: EMERGENCY MEDICINE
Payer: MEDICARE

## 2021-01-29 VITALS
TEMPERATURE: 98.4 F | OXYGEN SATURATION: 94 % | BODY MASS INDEX: 39.27 KG/M2 | HEIGHT: 61 IN | HEART RATE: 69 BPM | DIASTOLIC BLOOD PRESSURE: 78 MMHG | WEIGHT: 208 LBS | RESPIRATION RATE: 18 BRPM | SYSTOLIC BLOOD PRESSURE: 122 MMHG

## 2021-01-29 DIAGNOSIS — I25.10 CAD IN NATIVE ARTERY: ICD-10-CM

## 2021-01-29 LAB
ABO/RH: NORMAL
ALBUMIN SERPL-MCNC: 3.4 G/DL (ref 3.5–5.2)
ALP BLD-CCNC: 100 U/L (ref 35–104)
ALT SERPL-CCNC: 34 U/L (ref 0–32)
ANION GAP SERPL CALCULATED.3IONS-SCNC: 7 MMOL/L (ref 7–16)
ANTIBODY IDENTIFICATION: NORMAL
ANTIBODY SCREEN: NORMAL
APTT: 55.3 SEC (ref 24.5–35.1)
AST SERPL-CCNC: 79 U/L (ref 0–31)
BILIRUB SERPL-MCNC: 0.5 MG/DL (ref 0–1.2)
BUN BLDV-MCNC: 7 MG/DL (ref 8–23)
CALCIUM SERPL-MCNC: 8.6 MG/DL (ref 8.6–10.2)
CHLORIDE BLD-SCNC: 104 MMOL/L (ref 98–107)
CHOLESTEROL, TOTAL: 177 MG/DL (ref 0–199)
CO2: 25 MMOL/L (ref 22–29)
CREAT SERPL-MCNC: 0.8 MG/DL (ref 0.5–1)
DAT POLYSPECIFIC: NORMAL
DR. NOTIFY: NORMAL
EKG ATRIAL RATE: 88 BPM
EKG P-R INTERVAL: 146 MS
EKG Q-T INTERVAL: 372 MS
EKG QRS DURATION: 104 MS
EKG QTC CALCULATION (BAZETT): 450 MS
EKG R AXIS: -25 DEGREES
EKG T AXIS: -11 DEGREES
EKG VENTRICULAR RATE: 88 BPM
GFR AFRICAN AMERICAN: >60
GFR NON-AFRICAN AMERICAN: >60 ML/MIN/1.73
GLUCOSE BLD-MCNC: 101 MG/DL (ref 74–99)
HBA1C MFR BLD: 5.8 % (ref 4–5.6)
HCT VFR BLD CALC: 39.9 % (ref 34–48)
HDLC SERPL-MCNC: 62 MG/DL
HEMOGLOBIN: 13.5 G/DL (ref 11.5–15.5)
LDL CHOLESTEROL CALCULATED: 97 MG/DL (ref 0–99)
LV EF: 63 %
LVEF MODALITY: NORMAL
MAGNESIUM: 2.2 MG/DL (ref 1.6–2.6)
MCH RBC QN AUTO: 30.5 PG (ref 26–35)
MCHC RBC AUTO-ENTMCNC: 33.8 % (ref 32–34.5)
MCV RBC AUTO: 90.3 FL (ref 80–99.9)
PDW BLD-RTO: 15 FL (ref 11.5–15)
PHOSPHORUS: 2.5 MG/DL (ref 2.5–4.5)
PLATELET # BLD: 366 E9/L (ref 130–450)
PMV BLD AUTO: 9.7 FL (ref 7–12)
POC ACT LR: 293 SECONDS
POTASSIUM SERPL-SCNC: 3.9 MMOL/L (ref 3.5–5)
RBC # BLD: 4.42 E12/L (ref 3.5–5.5)
SODIUM BLD-SCNC: 136 MMOL/L (ref 132–146)
TOTAL PROTEIN: 6.9 G/DL (ref 6.4–8.3)
TRIGL SERPL-MCNC: 89 MG/DL (ref 0–149)
TROPONIN: 0.82 NG/ML (ref 0–0.03)
TROPONIN: 2.04 NG/ML (ref 0–0.03)
TSH SERPL DL<=0.05 MIU/L-ACNC: 2.1 UIU/ML (ref 0.27–4.2)
VLDLC SERPL CALC-MCNC: 18 MG/DL
WBC # BLD: 12.1 E9/L (ref 4.5–11.5)

## 2021-01-29 PROCEDURE — 6360000002 HC RX W HCPCS: Performed by: STUDENT IN AN ORGANIZED HEALTH CARE EDUCATION/TRAINING PROGRAM

## 2021-01-29 PROCEDURE — 92928 PRQ TCAT PLMT NTRAC ST 1 LES: CPT | Performed by: INTERNAL MEDICINE

## 2021-01-29 PROCEDURE — 93458 L HRT ARTERY/VENTRICLE ANGIO: CPT | Performed by: INTERNAL MEDICINE

## 2021-01-29 PROCEDURE — 86880 COOMBS TEST DIRECT: CPT

## 2021-01-29 PROCEDURE — 6360000002 HC RX W HCPCS: Performed by: INTERNAL MEDICINE

## 2021-01-29 PROCEDURE — 83036 HEMOGLOBIN GLYCOSYLATED A1C: CPT

## 2021-01-29 PROCEDURE — 84100 ASSAY OF PHOSPHORUS: CPT

## 2021-01-29 PROCEDURE — C1874 STENT, COATED/COV W/DEL SYS: HCPCS

## 2021-01-29 PROCEDURE — B2111ZZ FLUOROSCOPY OF MULTIPLE CORONARY ARTERIES USING LOW OSMOLAR CONTRAST: ICD-10-PCS | Performed by: INTERNAL MEDICINE

## 2021-01-29 PROCEDURE — 99284 EMERGENCY DEPT VISIT MOD MDM: CPT | Performed by: STUDENT IN AN ORGANIZED HEALTH CARE EDUCATION/TRAINING PROGRAM

## 2021-01-29 PROCEDURE — 84443 ASSAY THYROID STIM HORMONE: CPT

## 2021-01-29 PROCEDURE — 6370000000 HC RX 637 (ALT 250 FOR IP): Performed by: INTERNAL MEDICINE

## 2021-01-29 PROCEDURE — C1725 CATH, TRANSLUMIN NON-LASER: HCPCS

## 2021-01-29 PROCEDURE — 36415 COLL VENOUS BLD VENIPUNCTURE: CPT

## 2021-01-29 PROCEDURE — 86870 RBC ANTIBODY IDENTIFICATION: CPT

## 2021-01-29 PROCEDURE — 85027 COMPLETE CBC AUTOMATED: CPT

## 2021-01-29 PROCEDURE — 80061 LIPID PANEL: CPT

## 2021-01-29 PROCEDURE — 83735 ASSAY OF MAGNESIUM: CPT

## 2021-01-29 PROCEDURE — C8929 TTE W OR WO FOL WCON,DOPPLER: HCPCS

## 2021-01-29 PROCEDURE — B2151ZZ FLUOROSCOPY OF LEFT HEART USING LOW OSMOLAR CONTRAST: ICD-10-PCS | Performed by: INTERNAL MEDICINE

## 2021-01-29 PROCEDURE — C1769 GUIDE WIRE: HCPCS

## 2021-01-29 PROCEDURE — 4A023N7 MEASUREMENT OF CARDIAC SAMPLING AND PRESSURE, LEFT HEART, PERCUTANEOUS APPROACH: ICD-10-PCS | Performed by: INTERNAL MEDICINE

## 2021-01-29 PROCEDURE — 93458 L HRT ARTERY/VENTRICLE ANGIO: CPT

## 2021-01-29 PROCEDURE — 2500000003 HC RX 250 WO HCPCS

## 2021-01-29 PROCEDURE — 6360000004 HC RX CONTRAST MEDICATION: Performed by: INTERNAL MEDICINE

## 2021-01-29 PROCEDURE — C9600 PERC DRUG-EL COR STENT SING: HCPCS

## 2021-01-29 PROCEDURE — C1894 INTRO/SHEATH, NON-LASER: HCPCS

## 2021-01-29 PROCEDURE — 2580000003 HC RX 258: Performed by: STUDENT IN AN ORGANIZED HEALTH CARE EDUCATION/TRAINING PROGRAM

## 2021-01-29 PROCEDURE — 86901 BLOOD TYPING SEROLOGIC RH(D): CPT

## 2021-01-29 PROCEDURE — 86900 BLOOD TYPING SEROLOGIC ABO: CPT

## 2021-01-29 PROCEDURE — 027034Z DILATION OF CORONARY ARTERY, ONE ARTERY WITH DRUG-ELUTING INTRALUMINAL DEVICE, PERCUTANEOUS APPROACH: ICD-10-PCS | Performed by: INTERNAL MEDICINE

## 2021-01-29 PROCEDURE — C1887 CATHETER, GUIDING: HCPCS

## 2021-01-29 PROCEDURE — 6360000002 HC RX W HCPCS

## 2021-01-29 PROCEDURE — 2580000003 HC RX 258: Performed by: INTERNAL MEDICINE

## 2021-01-29 PROCEDURE — 85347 COAGULATION TIME ACTIVATED: CPT

## 2021-01-29 PROCEDURE — 84484 ASSAY OF TROPONIN QUANT: CPT

## 2021-01-29 PROCEDURE — 6370000000 HC RX 637 (ALT 250 FOR IP): Performed by: STUDENT IN AN ORGANIZED HEALTH CARE EDUCATION/TRAINING PROGRAM

## 2021-01-29 PROCEDURE — 80053 COMPREHEN METABOLIC PANEL: CPT

## 2021-01-29 PROCEDURE — 2060000000 HC ICU INTERMEDIATE R&B

## 2021-01-29 PROCEDURE — 86850 RBC ANTIBODY SCREEN: CPT

## 2021-01-29 PROCEDURE — 85730 THROMBOPLASTIN TIME PARTIAL: CPT

## 2021-01-29 PROCEDURE — 2709999900 HC NON-CHARGEABLE SUPPLY

## 2021-01-29 RX ORDER — SODIUM CHLORIDE 0.9 % (FLUSH) 0.9 %
10 SYRINGE (ML) INJECTION EVERY 12 HOURS SCHEDULED
Status: DISCONTINUED | OUTPATIENT
Start: 2021-01-29 | End: 2021-01-30 | Stop reason: HOSPADM

## 2021-01-29 RX ORDER — CLOPIDOGREL 300 MG/1
300 TABLET, FILM COATED ORAL ONCE
Status: COMPLETED | OUTPATIENT
Start: 2021-01-29 | End: 2021-01-29

## 2021-01-29 RX ORDER — ASPIRIN 81 MG/1
81 TABLET ORAL DAILY
Status: DISCONTINUED | OUTPATIENT
Start: 2021-01-30 | End: 2021-01-30 | Stop reason: HOSPADM

## 2021-01-29 RX ORDER — HEPARIN SODIUM 1000 [USP'U]/ML
60 INJECTION, SOLUTION INTRAVENOUS; SUBCUTANEOUS PRN
Status: DISCONTINUED | OUTPATIENT
Start: 2021-01-29 | End: 2021-01-29 | Stop reason: ALTCHOICE

## 2021-01-29 RX ORDER — CLOPIDOGREL BISULFATE 75 MG/1
75 TABLET ORAL DAILY
Status: DISCONTINUED | OUTPATIENT
Start: 2021-01-30 | End: 2021-01-30 | Stop reason: HOSPADM

## 2021-01-29 RX ORDER — ONDANSETRON 2 MG/ML
4 INJECTION INTRAMUSCULAR; INTRAVENOUS ONCE
Status: COMPLETED | OUTPATIENT
Start: 2021-01-29 | End: 2021-01-29

## 2021-01-29 RX ORDER — HEPARIN SODIUM 10000 [USP'U]/100ML
12 INJECTION, SOLUTION INTRAVENOUS CONTINUOUS
Status: DISCONTINUED | OUTPATIENT
Start: 2021-01-29 | End: 2021-01-29

## 2021-01-29 RX ORDER — HEPARIN SODIUM 1000 [USP'U]/ML
30 INJECTION, SOLUTION INTRAVENOUS; SUBCUTANEOUS PRN
Status: DISCONTINUED | OUTPATIENT
Start: 2021-01-29 | End: 2021-01-29 | Stop reason: ALTCHOICE

## 2021-01-29 RX ORDER — METOPROLOL SUCCINATE 25 MG/1
25 TABLET, EXTENDED RELEASE ORAL DAILY
Status: DISCONTINUED | OUTPATIENT
Start: 2021-01-29 | End: 2021-01-30 | Stop reason: HOSPADM

## 2021-01-29 RX ORDER — ACETAMINOPHEN 325 MG/1
650 TABLET ORAL EVERY 4 HOURS PRN
Status: DISCONTINUED | OUTPATIENT
Start: 2021-01-29 | End: 2021-01-30 | Stop reason: HOSPADM

## 2021-01-29 RX ORDER — SODIUM CHLORIDE 9 MG/ML
INJECTION, SOLUTION INTRAVENOUS CONTINUOUS
Status: ACTIVE | OUTPATIENT
Start: 2021-01-29 | End: 2021-01-29

## 2021-01-29 RX ORDER — ATORVASTATIN CALCIUM 40 MG/1
40 TABLET, FILM COATED ORAL NIGHTLY
Status: DISCONTINUED | OUTPATIENT
Start: 2021-01-29 | End: 2021-01-30 | Stop reason: HOSPADM

## 2021-01-29 RX ORDER — SODIUM CHLORIDE 0.9 % (FLUSH) 0.9 %
10 SYRINGE (ML) INJECTION PRN
Status: DISCONTINUED | OUTPATIENT
Start: 2021-01-29 | End: 2021-01-30 | Stop reason: HOSPADM

## 2021-01-29 RX ORDER — ATORVASTATIN CALCIUM 20 MG/1
80 TABLET, FILM COATED ORAL NIGHTLY
Status: DISCONTINUED | OUTPATIENT
Start: 2021-01-29 | End: 2021-01-29 | Stop reason: HOSPADM

## 2021-01-29 RX ADMIN — ENOXAPARIN SODIUM 90 MG: 100 INJECTION SUBCUTANEOUS at 10:06

## 2021-01-29 RX ADMIN — AMIODARONE HYDROCHLORIDE 150 MG: 50 INJECTION, SOLUTION INTRAVENOUS at 10:58

## 2021-01-29 RX ADMIN — Medication 10 ML: at 10:15

## 2021-01-29 RX ADMIN — CLOPIDOGREL BISULFATE 300 MG: 300 TABLET, FILM COATED ORAL at 10:09

## 2021-01-29 RX ADMIN — ACETAMINOPHEN 650 MG: 325 TABLET, FILM COATED ORAL at 08:22

## 2021-01-29 RX ADMIN — PERFLUTREN 1.65 MG: 6.52 INJECTION, SUSPENSION INTRAVENOUS at 10:54

## 2021-01-29 RX ADMIN — SODIUM CHLORIDE: 9 INJECTION, SOLUTION INTRAVENOUS at 19:00

## 2021-01-29 RX ADMIN — ASPIRIN 325 MG: 325 TABLET, COATED ORAL at 09:45

## 2021-01-29 RX ADMIN — ONDANSETRON HYDROCHLORIDE 4 MG: 2 INJECTION, SOLUTION INTRAMUSCULAR; INTRAVENOUS at 17:30

## 2021-01-29 RX ADMIN — METOPROLOL SUCCINATE 25 MG: 25 TABLET, EXTENDED RELEASE ORAL at 20:54

## 2021-01-29 RX ADMIN — ATORVASTATIN CALCIUM 40 MG: 40 TABLET, FILM COATED ORAL at 20:55

## 2021-01-29 RX ADMIN — METOPROLOL TARTRATE 25 MG: 25 TABLET, FILM COATED ORAL at 10:09

## 2021-01-29 RX ADMIN — AMIODARONE HYDROCHLORIDE 1 MG/MIN: 50 INJECTION, SOLUTION INTRAVENOUS at 11:08

## 2021-01-29 ASSESSMENT — PAIN SCALES - GENERAL
PAINLEVEL_OUTOF10: 4
PAINLEVEL_OUTOF10: 0

## 2021-01-29 NOTE — CONSULTS
Dis Sister 59        cabg       Social History:  Social History     Socioeconomic History    Marital status:      Spouse name: Not on file    Number of children: Not on file    Years of education: Not on file    Highest education level: Not on file   Occupational History    Not on file   Social Needs    Financial resource strain: Not on file    Food insecurity     Worry: Not on file     Inability: Not on file    Transportation needs     Medical: Not on file     Non-medical: Not on file   Tobacco Use    Smoking status: Never Smoker    Smokeless tobacco: Never Used   Substance and Sexual Activity    Alcohol use: Never     Frequency: Never    Drug use: Never    Sexual activity: Not on file   Lifestyle    Physical activity     Days per week: Not on file     Minutes per session: Not on file    Stress: Not on file   Relationships    Social connections     Talks on phone: Not on file     Gets together: Not on file     Attends Faith service: Not on file     Active member of club or organization: Not on file     Attends meetings of clubs or organizations: Not on file     Relationship status: Not on file    Intimate partner violence     Fear of current or ex partner: Not on file     Emotionally abused: Not on file     Physically abused: Not on file     Forced sexual activity: Not on file   Other Topics Concern    Not on file   Social History Narrative    Not on file       Allergies:   Allergies   Allergen Reactions    Latex Itching and Rash    Aspirin Nausea And Vomiting and Other (See Comments)     Stomach pain    Chloraprep One Step [Chlorhexidine Gluconate] Itching and Rash       Current Medications:  Current Facility-Administered Medications   Medication Dose Route Frequency Provider Last Rate Last Admin    metoprolol tartrate (LOPRESSOR) tablet 25 mg  25 mg Oral BID Ismail U Emile, DO   25 mg at 01/29/21 1009    atorvastatin (LIPITOR) tablet 80 mg  80 mg Oral Nightly Giorgi Kulkarni MD  sodium chloride flush 0.9 % injection 10 mL  10 mL Intravenous 2 times per day William Cradle, DO   10 mL at 01/29/21 1015    sodium chloride flush 0.9 % injection 10 mL  10 mL Intravenous PRN William Cradle, DO        polyethylene glycol (GLYCOLAX) packet 17 g  17 g Oral Daily PRN William Cradle, DO        acetaminophen (TYLENOL) tablet 650 mg  650 mg Oral Q6H PRN William Cradle, DO   650 mg at 01/29/21 0052    Or    acetaminophen (TYLENOL) suppository 650 mg  650 mg Rectal Q6H PRN William Cradle, DO        nitroGLYCERIN (NITROSTAT) SL tablet 0.4 mg  0.4 mg Sublingual Q5 Min PRN William Cradle, DO        aspirin EC tablet 325 mg  325 mg Oral Daily William Cradle, DO   325 mg at 01/29/21 0945    perflutren lipid microspheres (DEFINITY) injection 1.65 mg  1.5 mL Intravenous ONCE PRN William Cradle, DO         No current outpatient medications on file. Physical Exam:  /76   Pulse 92   Temp 98.4 °F (36.9 °C) (Oral)   Resp 13   Ht 5' 1\" (1.549 m)   Wt 208 lb (94.3 kg)   SpO2 94%   BMI 39.30 kg/m²   Wt Readings from Last 3 Encounters:   01/28/21 208 lb (94.3 kg)   02/17/20 204 lb (92.5 kg)   01/24/20 205 lb (93 kg)     Appearance: Awake, alert, no acute respiratory distress  Skin: Intact, no rash  Head: Normocephalic, atraumatic  Eyes: EOMI, no conjunctival erythema  Neck: Supple, no elevated JVP, no carotid bruits  Lungs: Clear to auscultation bilaterally. No wheezes, rales, or rhonchi.   Cardiac: Regular rate and rhythm, +S1S2, no murmurs apparent  Abdomen: Soft, nontender, +bowel sounds  Extremities: Moves all extremities x 4, no lower extremity edema  Neurologic: No focal motor deficits apparent, normal mood and affect  Peripheral Pulses: Intact posterior tibial pulses bilaterally    Laboratory Tests:  Lab Results   Component Value Date    CREATININE 0.8 01/29/2021    BUN 7 (L) 01/29/2021     01/29/2021    K 3.9 01/29/2021     01/29/2021    CO2 25 01/29/2021     Lab Results   Component Value Date    MG 2.2 01/29/2021     Lab Results   Component Value Date    WBC 10.7 01/28/2021    HGB 15.1 01/28/2021    HCT 45.5 01/28/2021    MCV 92.1 01/28/2021     01/28/2021     Lab Results   Component Value Date    ALT 34 (H) 01/29/2021    AST 79 (H) 01/29/2021    ALKPHOS 100 01/29/2021    BILITOT 0.5 01/29/2021     Lab Results   Component Value Date    TROPONINI 0.82 (H) 01/29/2021    TROPONINI 0.62 (H) 01/28/2021    TROPONINI 0.32 (H) 01/28/2021     Lab Results   Component Value Date    INR 1.0 01/28/2021    PROTIME 11.6 01/28/2021     Lab Results   Component Value Date    TSH 2.100 01/29/2021     No results found for: LABA1C  No results found for: EAG  Lab Results   Component Value Date    CHOL 177 01/29/2021    CHOL 206 (H) 07/31/2012     Lab Results   Component Value Date    TRIG 89 01/29/2021    TRIG 129 07/31/2012     Lab Results   Component Value Date    HDL 62 01/29/2021    HDL 53.0 07/31/2012     Lab Results   Component Value Date    LDLCALC 97 01/29/2021    LDLCALC 127 (H) 07/31/2012     Lab Results   Component Value Date    LABVLDL 18 01/29/2021     No results found for: CHOLHDLRATIO  No results for input(s): PROBNP in the last 72 hours. Cardiac Tests:  ECG: Right bundle branch block. Normal sinus rhythm. Nonspecific T wave abnormalities. ASSESSMENT / PLAN:  59-year-old female with no significant cardiac medical history presents with NSTEMI peak troponin 0.8. No significant findings on cardiac exam.  Patient went into a brief episode of ventricular fibrillation but spontaneously resolved within a few seconds. Recommendations  Keep n.p.o. for left heart catheterization. Transfer to [facility]. Continue aspirin. Loaded with Plavix. Continue anticoagulation with enoxaparin. Start atorvastatin. Continue metoprolol. Start amiodarone temporarily until the patient gets to the Cath Lab given the brief episode of ventricular fibrillation.     Thank

## 2021-01-29 NOTE — ED NOTES
DR Brooke Royal NOTIFIED OF CARDIOLOGY WANTING TO TRANSFER PATIENT     Riccardo Mata, HUGO  01/29/21 2765

## 2021-01-29 NOTE — ED PROVIDER NOTES
MARGOTH Richardson is a 77 y.o. female with a PMHx significant for diverticulitis and biliary colic who presents with a complaint of vague chest discomfort for the last 48 hours. The patient states that the symptoms began abruptly and while she was at rest.  She has great difficulty describing it and even hesitates to call it pain. She repeatedly states, \"it is just an uncomfortable odd feeling. \"  When pressed, she did state that it felt like a mild pressure sensation. She states that the pain does not radiate anywhere and she cannot think of anything that makes it better or worse. It has occurred intermittently over the last 48 hours. Of note, the patient states that she has been under a significant amount of stress recently. She states that her , who has Parkinson's, was in the ICU for several weeks with Covid. She states she was told several times that he was likely going to pass away. However, over the last 4 to 5 days the patient was discharged. She was concerned about where she was going to be able to place him, adding more stress to her life. She states that he is currently in a rehab facility. The patient denies recent trauma, fever, chills, fatigue, HA, dizziness, vision changes, congestion, rhinorrhea, neck pain, palpitations, hx of MI, hx of blood clots, LE edema, SOB, cough, wheezing, abdominal pain, N/V/D/C, hematochezia, melena, dysuria, hematuria, generalized weakness and paresthesias. The patient is currently taking no blood thinners. Tobacco Hx:   reports that she has never smoked. She has never used smokeless tobacco.    Alcohol Hx:   reports no history of alcohol use. Illicit Drug Hx:  Reports no history of illicit drug use. The history is provided by the patient. Last Tetanus (if applicable): N/A    Review of Systems   Constitutional: Negative for chills, diaphoresis, fatigue and fever. HENT: Negative for congestion, rhinorrhea and sore throat.     Eyes: throughout. Chest:      Chest wall: No tenderness. Abdominal:      General: Bowel sounds are normal. There is no distension. Palpations: Abdomen is soft. Tenderness: There is no abdominal tenderness. There is no guarding or rebound. Musculoskeletal: Normal range of motion. General: No tenderness or deformity. Right lower leg: No edema. Left lower leg: No edema. Lymphadenopathy:      Cervical: No cervical adenopathy. Skin:     General: Skin is warm and dry. Capillary Refill: Capillary refill takes less than 2 seconds. Findings: No erythema or rash. Neurological:      General: No focal deficit present. Mental Status: She is alert and oriented to person, place, and time. Sensory: No sensory deficit. Motor: No weakness. Coordination: Coordination normal.        --------------------------------------------- PAST HISTORY ---------------------------------------------  Past Medical History:  has a past medical history of Cyst of left kidney, Diverticulitis, Gallbladder problem, History of blood transfusion, Phlebitis, and PONV (postoperative nausea and vomiting). Past Surgical History:  has a past surgical history that includes Hysterectomy, vaginal (2012); Toe Surgery (2000); Splenectomy, total (1979); Tonsillectomy; Ectopic pregnancy surgery (1979); Colonoscopy; and Cholecystectomy, laparoscopic (N/A, 2/4/2020). Social History:  reports that she has never smoked. She has never used smokeless tobacco. She reports that she does not drink alcohol or use drugs. Family History: family history includes Coronary Art Dis (age of onset: 61) in her sister; Emphysema in her father. Home Meds: Not in a hospital admission. The patients home medications have been reviewed.     Allergies: Latex, Aspirin, and Chloraprep one step [chlorhexidine gluconate]    ------------------------- NURSING NOTES AND VITALS REVIEWED ---------------------------  Date / Time Roomed:  1/28/2021  4:03 PM  ED Bed Assignment:  07/07    The nursing notes within the ED encounter and vital signs as below have been reviewed. /74   Pulse 87   Temp 98.4 °F (36.9 °C) (Oral)   Resp 14   Ht 5' 1\" (1.549 m)   Wt 208 lb (94.3 kg)   SpO2 96%   BMI 39.30 kg/m²   -------------------------------------------------- RESULTS / INTERVENTIONS -------------------------------------------------  All laboratory and radiology tests have been reviewed by this physician.     LABS:  Results for orders placed or performed during the hospital encounter of 01/28/21   CBC Auto Differential   Result Value Ref Range    WBC 10.7 4.5 - 11.5 E9/L    RBC 4.94 3.50 - 5.50 E12/L    Hemoglobin 15.1 11.5 - 15.5 g/dL    Hematocrit 45.5 34.0 - 48.0 %    MCV 92.1 80.0 - 99.9 fL    MCH 30.6 26.0 - 35.0 pg    MCHC 33.2 32.0 - 34.5 %    RDW 14.5 11.5 - 15.0 fL    Platelets 961 098 - 554 E9/L    MPV 9.7 7.0 - 12.0 fL    Neutrophils % 61.9 43.0 - 80.0 %    Immature Granulocytes % 0.4 0.0 - 5.0 %    Lymphocytes % 26.0 20.0 - 42.0 %    Monocytes % 11.0 2.0 - 12.0 %    Eosinophils % 0.2 0.0 - 6.0 %    Basophils % 0.5 0.0 - 2.0 %    Neutrophils Absolute 6.61 1.80 - 7.30 E9/L    Immature Granulocytes # 0.04 E9/L    Lymphocytes Absolute 2.77 1.50 - 4.00 E9/L    Monocytes Absolute 1.17 (H) 0.10 - 0.95 E9/L    Eosinophils Absolute 0.02 (L) 0.05 - 0.50 E9/L    Basophils Absolute 0.05 0.00 - 0.20 E9/L   Comprehensive Metabolic Panel w/ Reflex to MG   Result Value Ref Range    Sodium 141 132 - 146 mmol/L    Potassium reflex Magnesium 4.2 3.5 - 5.0 mmol/L    Chloride 106 98 - 107 mmol/L    CO2 26 22 - 29 mmol/L    Anion Gap 9 7 - 16 mmol/L    Glucose 98 74 - 99 mg/dL    BUN 8 8 - 23 mg/dL    CREATININE 0.7 0.5 - 1.0 mg/dL    GFR Non-African American >60 >=60 mL/min/1.73    GFR African American >60     Calcium 9.2 8.6 - 10.2 mg/dL    Total Protein 7.4 6.4 - 8.3 g/dL    Albumin 3.7 3.5 - 5.2 g/dL    Total Bilirubin 0.4 0.0 - 1.2 mg/dL Alkaline Phosphatase 107 (H) 35 - 104 U/L    ALT 25 0 - 32 U/L    AST 47 (H) 0 - 31 U/L   Troponin   Result Value Ref Range    Troponin 0.32 (H) 0.00 - 0.03 ng/mL   Protime-INR   Result Value Ref Range    Protime 11.6 9.3 - 12.4 sec    INR 1.0    APTT   Result Value Ref Range    aPTT 30.1 24.5 - 35.1 sec   Lipase   Result Value Ref Range    Lipase 29 13 - 60 U/L   Lactic Acid, Plasma   Result Value Ref Range    Lactic Acid 1.5 0.5 - 2.2 mmol/L   Troponin   Result Value Ref Range    Troponin 0.62 (H) 0.00 - 0.03 ng/mL   Comprehensive Metabolic Panel   Result Value Ref Range    Sodium 136 132 - 146 mmol/L    Potassium 3.9 3.5 - 5.0 mmol/L    Chloride 104 98 - 107 mmol/L    CO2 25 22 - 29 mmol/L    Anion Gap 7 7 - 16 mmol/L    Glucose 101 (H) 74 - 99 mg/dL    BUN 7 (L) 8 - 23 mg/dL    CREATININE 0.8 0.5 - 1.0 mg/dL    GFR Non-African American >60 >=60 mL/min/1.73    GFR African American >60     Calcium 8.6 8.6 - 10.2 mg/dL    Total Protein 6.9 6.4 - 8.3 g/dL    Albumin 3.4 (L) 3.5 - 5.2 g/dL    Total Bilirubin 0.5 0.0 - 1.2 mg/dL    Alkaline Phosphatase 100 35 - 104 U/L    ALT 34 (H) 0 - 32 U/L    AST 79 (H) 0 - 31 U/L   TSH without Reflex   Result Value Ref Range    TSH 2.100 0.270 - 4.200 uIU/mL   Magnesium   Result Value Ref Range    Magnesium 2.2 1.6 - 2.6 mg/dL   Phosphorus   Result Value Ref Range    Phosphorus 2.5 2.5 - 4.5 mg/dL   Lipid Panel   Result Value Ref Range    Cholesterol, Total 177 0 - 199 mg/dL    Triglycerides 89 0 - 149 mg/dL    HDL 62 >40 mg/dL    LDL Calculated 97 0 - 99 mg/dL    VLDL Cholesterol Calculated 18 mg/dL   Troponin   Result Value Ref Range    Troponin 0.82 (H) 0.00 - 0.03 ng/mL   EKG 12 Lead   Result Value Ref Range    Ventricular Rate 88 BPM    Atrial Rate 88 BPM    P-R Interval 146 ms    QRS Duration 62 ms    Q-T Interval 372 ms    QTc Calculation (Bazett) 450 ms    R Axis -25 degrees    T Axis -11 degrees       RADIOLOGY: Interpreted by Radiologist unless otherwise noted.   CTA PULMONARY W CONTRAST   Final Result   No evidence of pulmonary embolism or acute pulmonary abnormality. Small hiatal hernia. 3.3 cm left renal peripelvic cyst.          EKG: As interpreted by this ER physician.   Rate: 97 bpm  Rhythm: sinus with incomplete RBBB  Axis: normal  ST Segments: no acute change  T-Waves: inversion in  v1, v2, v3, v4, II, III and aVf  Interpretation: Sinus rhythm with incomplete RBBB, minimal voltage criteria for LVH  Comparison: no previous EKG    Oxygen Saturation Interpretation: Normal    Meds Given:  Medications   sodium chloride flush 0.9 % injection 10 mL (10 mLs Intravenous Given 1/29/21 1015)   sodium chloride flush 0.9 % injection 10 mL (has no administration in time range)   polyethylene glycol (GLYCOLAX) packet 17 g (has no administration in time range)   acetaminophen (TYLENOL) tablet 650 mg (650 mg Oral Given 1/29/21 9234)     Or   acetaminophen (TYLENOL) suppository 650 mg ( Rectal See Alternative 1/29/21 9886)   nitroGLYCERIN (NITROSTAT) SL tablet 0.4 mg (has no administration in time range)   aspirin EC tablet 325 mg (325 mg Oral Given 1/29/21 0945)   metoprolol tartrate (LOPRESSOR) tablet 25 mg (25 mg Oral Given 1/29/21 1009)   atorvastatin (LIPITOR) tablet 80 mg (80 mg Oral Not Given 1/29/21 1010)   amiodarone (CORDARONE) 150 mg in dextrose 5 % 100 mL bolus (0 mg Intravenous Stopped 1/29/21 1108)     Followed by   amiodarone (CORDARONE) 450 mg in dextrose 5 % 250 mL infusion (1 mg/min Intravenous New Bag 1/29/21 1108)     Followed by   amiodarone (CORDARONE) 450 mg in dextrose 5 % 250 mL infusion (has no administration in time range)   0.9 % sodium chloride bolus (0 mLs Intravenous Stopped 1/28/21 1838)   iopamidol (ISOVUE-370) 76 % injection 75 mL (75 mLs Intravenous Given 1/28/21 1758)   enoxaparin (LOVENOX) injection 90 mg (90 mg Subcutaneous Given 1/28/21 1928)   aspirin chewable tablet 324 mg (324 mg Oral Given 1/28/21 1929)   aluminum & magnesium hydroxide-simethicone (MAALOX) 30 mL, lidocaine viscous hcl (XYLOCAINE) 5 mL (GI COCKTAIL) ( Oral Given 1/28/21 1929)   ondansetron (ZOFRAN) injection 4 mg (4 mg Intravenous Given 1/28/21 1926)   metoprolol tartrate (LOPRESSOR) tablet 25 mg (25 mg Oral Given 1/28/21 1929)   perflutren lipid microspheres (DEFINITY) injection 1.65 mg (1.65 mg Intravenous Given 1/29/21 1054)   enoxaparin (LOVENOX) injection 90 mg (90 mg Subcutaneous Given 1/29/21 1006)   clopidogrel (PLAVIX) tablet 300 mg (300 mg Oral Given 1/29/21 1009)       Procedures:  No procedures performed. --------------------------------- PROGRESS NOTES / ADDITIONAL PROVIDER NOTES ---------------------------------  Consultations:  As outlined below. ED Course:    ED Course as of Jan 29 1120   Thu Jan 28, 2021   0560 Patient is resting in bed in no distress. Discussed with results of labs thus far. [MS]   [phone] Discussed with patient that her cardiac enzymes are elevated. This could indicate a minor heart attack and that she needs to be admitted for further evaluation. She notes was very hesitant to stay and states that she wanted to go home and would come back. We discussed how serious this could be and she has agreed to stay. Not currently experiencing any pain at this time. [MS]   1900 I discussed the case with Dr. Dina Guerrier of cardiology. He agrees that the patient should be admitted. He request that we start her on aspirin and Lovenox. In addition I will give her dose of beta-blocker. [ML]   1910 Discussed the case with Dr. Libby Morgan of . He agrees to admit the patient for further evaluation and management. [ML]      ED Course User Index  [ML] Lonie Hodgkin, DO  [MS] Aiden Wilfred, [place]       3047: All results were discussed with the patient and I have provided specific details regarding the plan to admit the patient. The patient was stable at the time of admission and was without objective evidence of hemodynamic instability.  She was seen in the emergency department by myself and the assigned attending physician, Dr. Vahe Javier, who agreed with the assessment, plan and decision to admit as laid out herein. The patient verbalized an understanding and agreement with the plan for admission. All questions were answered and the patient was deemed to be in stable condition at the time of transport. MDM:  Patient presented from home complaining of vague chest discomfort over the last 2 days. On arrival, she was hypertensive with remaining vital signs within normal limits. EKG and physical exam were as documented above. A work-up was initiated to further evaluate the patient's presenting complaints. Initial troponin was elevated at 0.32. Kidney function was normal with a creatinine of 0.7. No leukocytosis noted. Lactate was normal.  Mild elevations in alk phos and AST were noted. CT of the chest did not reveal any pulmonary emboli or other acute intrathoracic pathology. Given the patient's EKG changes and significantly elevated troponin without evidence of kidney disease, the patient was thought to have an NSTEMI. The case was discussed with cardiology who was in agreement. The patient was started on Lovenox, aspirin and given an oral dose of metoprolol. Case was discussed with internal medicine who agreed to admit. The patient was stable at the time of her disposition. This patient's ED course included: a personal history and physicial examination, re-evaluation prior to disposition, multiple bedside re-evaluations, IV medications, cardiac monitoring, continuous pulse oximetry and complex medical decision making and emergency management    Diagnosis:  1. NSTEMI (non-ST elevated myocardial infarction) ([facility] [address]        Disposition:  Patient's disposition: Admit to telemetry  Patient's condition is stable. This patient was seen, examined and treated with Dr. Vahe Javier.  All pertinent aspects of the patient's care were discussed with the attending physician. ATTENDING PROVIDER ATTESTATION:     Merline Rile presented to the emergency department for evaluation of Chest Pain (PT UNDER STRESS AND MID CHEST DOES NOT FEEL RIGHT)    I have reviewed and discussed the case, including pertinent history (medical, surgical, family and social) and exam findings with the Resident and the Nurse assigned to Merline Rile. I have personally performed and/or participated in the history, exam, medical decision making, and procedures and agree with all pertinent clinical information. Resting in bed no distress. No diaphoresis or pallor. Heart regular rhythm. Lungs are clear to auscultation. There is no pretibial edema nor calf tenderness bilaterally. Critical care:  Please note that the withdrawal or failure to initiate urgent interventions for this patient would likely result in a life threatening deterioration or permanent disability. Systems at risk for deterioration include: Non-STEMI Lovenox given. Accordingly this patient received 31 minutes of critical care time, excluding separately billable procedures. I have reviewed my findings and recommendations with Merline Rile and members of family present at the time of disposition. MDM: Supportive care, will obtain appropriate labs and imaging to assess patient's Chest Pain (PT UNDER STRESS AND MID CHEST DOES NOT FEEL RIGHT)       My findings/plan: The encounter diagnosis was NSTEMI (non-ST elevated myocardial infarction) ([facility] [address]   New Prescriptions    No medications on file     Thornton Litten, [place] [address] Bustos DO  Resident  01/29/21 0050       Thornton Litten, DO  01/29/21 1120

## 2021-01-29 NOTE — PROGRESS NOTES
Called the access line to facilitate transfer to [place] [address]. Patient presented with a NSTEMI peak troponin 0.8. She developed ventricular fibrillation which did spontaneously resolve. Was seen and evaluated by cardiologist, Dr. Slim Farr. Recommendation is for transfer to [place] [address] for heart catheterization. After discussing patient with [facility] the ED  informed me that they, the department, had been informed the patient was scheduled for  at 1230. Called access center back and informed them of this. Informed Sabine if she needed any further information to please call me.        ANGEL Mackenzie - CNP,   1/29/21  10:52 AM EST

## 2021-01-29 NOTE — ED NOTES
ECHO TO BE DONE AT BEDSIDE, PATIENT AND HER DAUGHTER UPDATED ON PLAN OF CARE     Isaac Mcmanus RN  01/29/21 2668

## 2021-01-29 NOTE — H&P
Department of Internal Medicine  History and Physical    PCP: Dr. Jerome Smalls   Admitting Physician: Dr. Zay Choi  Consultants: Dr. Cathy Reinoso:  Chest discomfort    HISTORY OF PRESENT ILLNESS:    Patient is 42-year-old female who presented to the ED with chest discomfort which started over the last 2 days. States that it has progressively become worse. There was nothing that necessarily made it worse or better. She describes it as tightness/pressure with radiation to the back. Lasts maybe a few minutes but she is not sure. She did admit to some palpitations. But denies any lightheadedness or dizziness. Denied associated shortness of breath.       PAST MEDICAL Hx:  Past Medical History:   Diagnosis Date    Cyst of left kidney     x 2    Diverticulitis     Gallbladder problem 01/2020    History of blood transfusion 40 years ago    Phlebitis 40 years ago    while hospitalized    PONV (postoperative nausea and vomiting)        PAST SURGICAL Hx:   Past Surgical History:   Procedure Laterality Date    CHOLECYSTECTOMY, LAPAROSCOPIC N/A 2/4/2020    LAPAROSCOPIC ROBOTIC XI ASSISTED CHOLECYSTECTOMY performed by Shani Henderson MD at [facility]  2012    SPLENECTOMY, TOTAL  1979    TOE SURGERY  2000    TONSILLECTOMY         FAMILY Hx:  Family History   Problem Relation Age of Onset    Emphysema Father     Coronary Art Dis Sister [address]       HOME MEDICATIONS:  Prior to Admission medications    Not on File       ALLERGIES:  Latex, Aspirin, and Chloraprep one step [chlorhexidine gluconate]    SOCIAL Hx:  Social History     Socioeconomic History    Marital status:      Spouse name: Not on file    Number of children: Not on file    Years of education: Not on file    Highest education level: Not on file   Occupational History    Not on file   Social Needs    Financial resource strain: Not on file   [place] insecurity     Worry: Not on file     Inability: Not on file    Transportation needs     Medical: Not on file     Non-medical: Not on file   Tobacco Use    Smoking status: Never Smoker    Smokeless tobacco: Never Used   Substance and Sexual Activity    Alcohol use: Never     Frequency: Never    Drug use: Never    Sexual activity: Not on file   Lifestyle    Physical activity     Days per week: Not on file     Minutes per session: Not on file    Stress: Not on file   Relationships    Social connections     Talks on phone: Not on file     Gets together: Not on file     Attends Synagogue service: Not on file     Active member of club or organization: Not on file     Attends meetings of clubs or organizations: Not on file     Relationship status: Not on file    Intimate partner violence     Fear of current or ex partner: Not on file     Emotionally abused: Not on file     Physically abused: Not on file     Forced sexual activity: Not on file   Other Topics Concern    Not on file   Social History Narrative    Not on file       ROS: Positive in bold  General:   Denies chills, fatigue, fever, malaise, night sweats or weight loss    Psychological:   Denies anxiety, disorientation or hallucinations    ENT:    Denies epistaxis, headaches, vertigo or visual changes    Cardiovascular:   Denies any chest pain, irregular heartbeats, or palpitations. No paroxysmal nocturnal dyspnea. Respiratory:   Denies shortness of breath, coughing, sputum production, hemoptysis, or wheezing. No orthopnea. Gastrointestinal:   Denies nausea, vomiting, diarrhea, or constipation. Denies any abdominal pain. Denies change in bowel habits or stools. Genito-Urinary:    Denies any urgency, frequency, hematuria. Voiding without difficulty. Musculoskeletal:   Denies joint pain, joint stiffness, joint swelling or muscle pain    Neurology:    Denies any headache or focal neurological deficits.  No weakness or paresthesia. Derm:    Denies any rashes, ulcers, or excoriations. Denies bruising. Extremities:   Denies any lower extremity swelling or edema. PHYSICAL EXAM:  VITALS:  Vitals:    01/28/21 2058   BP: 136/84   Pulse: 86   Resp: 20   Temp: 98.4 °F (36.9 °C)   SpO2: 96%         CONSTITUTIONAL:    Awake, alert, cooperative, no apparent distress, and appears stated age    EYES:    PERRL, EOMI, sclera clear, conjunctiva normal    ENT:    Normocephalic, atraumatic, sinuses nontender on palpation. External ears without lesions. Oral pharynx with moist mucus membranes. Tonsils without erythema or exudates. NECK:    Supple, symmetrical, trachea midline, no adenopathy, thyroid symmetric, not enlarged and no tenderness, skin normal, no bruits, no JVD    HEMATOLOGIC/LYMPHATICS:    No cervical lymphadenopathy and no supraclavicular lymphadenopathy    LUNGS:    Symmetric. No increased work of breathing, good air exchange, clear to auscultation bilaterally, no wheezes, rhonchi, or rales,     CARDIOVASCULAR:    Normal apical impulse, regular rate and rhythm, normal S1 and S2, no S3 or S4, and no murmur noted    ABDOMEN:    No scars, normal bowel sounds, soft, non-distended, non-tender, no masses palpated, no hepatosplenomegaly, no rebound or guarding elicited on palpation     MUSCULOSKELETAL:    There is no redness, warmth, or swelling of the joints. Full range of motion noted. Motor strength is 5 out of 5 all extremities bilaterally. Tone is normal.    NEUROLOGIC:    Awake, alert, oriented to name, place and time. Cranial nerves II-XII are grossly intact. Motor is 5 out of 5 bilaterally. SKIN:    No bruising or bleeding. No redness, warmth, or swelling    EXTREMITIES:    Peripheral pulses present. No edema, cyanosis, or swelling. OSTEOPATHIC:    Examined in seated and supine positions. Normal thoracic kyphosis and lumbar lordosis. No acute somatic dysfunction.     LINES/CATHETERS     LABORATORY DATA:  CBC with Differential:    Lab Results   Component Value Date    WBC 10.7 01/28/2021    RBC 4.94 01/28/2021    HGB 15.1 01/28/2021    HCT 45.5 01/28/2021     01/28/2021    MCV 92.1 01/28/2021    MCH 30.6 01/28/2021    MCHC 33.2 01/28/2021    RDW 14.5 01/28/2021    LYMPHOPCT 26.0 01/28/2021    MONOPCT 11.0 01/28/2021    BASOPCT 0.5 01/28/2021    MONOSABS 1.17 01/28/2021    LYMPHSABS 2.77 01/28/2021    EOSABS 0.02 01/28/2021    BASOSABS 0.05 01/28/2021     CMP:    Lab Results   Component Value Date     01/28/2021    K 4.2 01/28/2021     01/28/2021    CO2 26 01/28/2021    BUN 8 01/28/2021    CREATININE 0.7 01/28/2021    GFRAA >60 01/28/2021    LABGLOM >60 01/28/2021    GLUCOSE 98 01/28/2021    PROT 7.4 01/28/2021    LABALBU 3.7 01/28/2021    CALCIUM 9.2 01/28/2021    BILITOT 0.4 01/28/2021    ALKPHOS 107 01/28/2021    AST 47 01/28/2021    ALT 25 01/28/2021       ASSESSMENT/PLAN:  1. NSTEMI  2. Left renal cyst  3. History of splenectomy    Patient had elevated troponin on  Presentation with associated chest discomfort. Patient states sister has history of premature heart disease. Cardiology consulted. Placed on Lovenox and aspirin. Placed on beta-blocker as well which will be continued. We will continue to trend enzyme. Repeat EKG ordered.     Susan Kang D.O.  9:59 PM  1/28/2021    Electronically signed by Susan Kang DO on 1/28/21 at 9:59 PM EST

## 2021-01-30 VITALS
SYSTOLIC BLOOD PRESSURE: 127 MMHG | RESPIRATION RATE: 16 BRPM | HEART RATE: 86 BPM | WEIGHT: 208 LBS | OXYGEN SATURATION: 96 % | TEMPERATURE: 98.9 F | BODY MASS INDEX: 39.27 KG/M2 | DIASTOLIC BLOOD PRESSURE: 59 MMHG | HEIGHT: 61 IN

## 2021-01-30 LAB
ANION GAP SERPL CALCULATED.3IONS-SCNC: 10 MMOL/L (ref 7–16)
APTT: 30.6 SEC (ref 24.5–35.1)
BUN BLDV-MCNC: 7 MG/DL (ref 8–23)
CALCIUM SERPL-MCNC: 8.8 MG/DL (ref 8.6–10.2)
CHLORIDE BLD-SCNC: 103 MMOL/L (ref 98–107)
CO2: 23 MMOL/L (ref 22–29)
CREAT SERPL-MCNC: 0.8 MG/DL (ref 0.5–1)
GFR AFRICAN AMERICAN: >60
GFR NON-AFRICAN AMERICAN: >60 ML/MIN/1.73
GLUCOSE BLD-MCNC: 103 MG/DL (ref 74–99)
HCT VFR BLD CALC: 41.5 % (ref 34–48)
HEMOGLOBIN: 13.5 G/DL (ref 11.5–15.5)
MAGNESIUM: 2.2 MG/DL (ref 1.6–2.6)
MCH RBC QN AUTO: 30.1 PG (ref 26–35)
MCHC RBC AUTO-ENTMCNC: 32.5 % (ref 32–34.5)
MCV RBC AUTO: 92.6 FL (ref 80–99.9)
PDW BLD-RTO: 15 FL (ref 11.5–15)
PLATELET # BLD: 361 E9/L (ref 130–450)
PMV BLD AUTO: 10.2 FL (ref 7–12)
POTASSIUM SERPL-SCNC: 4 MMOL/L (ref 3.5–5)
RBC # BLD: 4.48 E12/L (ref 3.5–5.5)
SODIUM BLD-SCNC: 136 MMOL/L (ref 132–146)
WBC # BLD: 9.5 E9/L (ref 4.5–11.5)

## 2021-01-30 PROCEDURE — 6370000000 HC RX 637 (ALT 250 FOR IP): Performed by: INTERNAL MEDICINE

## 2021-01-30 PROCEDURE — 80048 BASIC METABOLIC PNL TOTAL CA: CPT

## 2021-01-30 PROCEDURE — 85027 COMPLETE CBC AUTOMATED: CPT

## 2021-01-30 PROCEDURE — 36415 COLL VENOUS BLD VENIPUNCTURE: CPT

## 2021-01-30 PROCEDURE — 2580000003 HC RX 258: Performed by: INTERNAL MEDICINE

## 2021-01-30 PROCEDURE — 85730 THROMBOPLASTIN TIME PARTIAL: CPT

## 2021-01-30 PROCEDURE — 99233 SBSQ HOSP IP/OBS HIGH 50: CPT | Performed by: INTERNAL MEDICINE

## 2021-01-30 PROCEDURE — 83735 ASSAY OF MAGNESIUM: CPT

## 2021-01-30 RX ORDER — ASPIRIN 81 MG/1
81 TABLET ORAL DAILY
Qty: 30 TABLET | Refills: 3 | Status: SHIPPED | OUTPATIENT
Start: 2021-01-31

## 2021-01-30 RX ORDER — METOPROLOL SUCCINATE 25 MG/1
25 TABLET, EXTENDED RELEASE ORAL DAILY
Qty: 30 TABLET | Refills: 3 | Status: SHIPPED | OUTPATIENT
Start: 2021-01-31 | End: 2021-06-02 | Stop reason: SDUPTHER

## 2021-01-30 RX ORDER — ATORVASTATIN CALCIUM 40 MG/1
40 TABLET, FILM COATED ORAL NIGHTLY
Qty: 30 TABLET | Refills: 3 | Status: SHIPPED | OUTPATIENT
Start: 2021-01-30 | End: 2021-06-02 | Stop reason: SDUPTHER

## 2021-01-30 RX ORDER — CLOPIDOGREL BISULFATE 75 MG/1
75 TABLET ORAL DAILY
Qty: 30 TABLET | Refills: 3 | Status: SHIPPED | OUTPATIENT
Start: 2021-01-31 | End: 2021-06-02 | Stop reason: SDUPTHER

## 2021-01-30 RX ADMIN — CLOPIDOGREL BISULFATE 75 MG: 75 TABLET ORAL at 09:01

## 2021-01-30 RX ADMIN — ASPIRIN 81 MG: 81 TABLET, COATED ORAL at 09:01

## 2021-01-30 RX ADMIN — SODIUM CHLORIDE, PRESERVATIVE FREE 10 ML: 5 INJECTION INTRAVENOUS at 09:01

## 2021-01-30 RX ADMIN — METOPROLOL SUCCINATE 25 MG: 25 TABLET, EXTENDED RELEASE ORAL at 09:01

## 2021-01-30 ASSESSMENT — PAIN SCALES - GENERAL: PAINLEVEL_OUTOF10: 0

## 2021-01-30 NOTE — CONSULTS
Met with patient and discussed that their physician has ordered a referral to our outpatient Phase II Cardiac Rehabilitation program. Reviewed the benefits of cardiac rehabilitation based on their diagnosis and personal risk factors. Patient demonstrates strong interest in Cardiac Rehabilitation at this time. Cardiac Rehabilitation brochure provided to patient/family. The Cardiac Rehabilitation Program has been provided the patient's referral information and pertinent patient details and history. The patient may call [facility] at [phone] for additional information or questions. Contact information for [facility] and other choices close to the patient's residence have been provided in the discharge instructions so that the patient may call and schedule an appointment when cleared by their physician.  Thank you for the referral.

## 2021-01-30 NOTE — PROCEDURES
[address]                  [address] [place],  [facility]                            CARDIAC CATHETERIZATION    PATIENT NAME: Silas Camp                    :        1955  MED REC NO:   [id number]                            ROOM:       9416  ACCOUNT NO:   [de-identified]                           ADMIT DATE: 2021  PROVIDER:     Amanda Cheng MD    DATE OF PROCEDURE:  2021    LEFT HEART CATHETERIZATION AND PCI    INDICATION:  Non-ST-elevation myocardial infarction. REFERRING PROVIDER:  Dr. Romelia Florian. PROCEDURE:  [place]  to undergo left heart catheterization due to non-ST-elevation myocardial  infarction. She was brought to the cardiac cath lab in her usual  fasting state. Under sterile condition and under local anesthetic and  using conscious sedation, a 6-Syriac Terumo Slender sheath was inserted  into the right radial artery. The patient received 5000 of intravenous  heparin. She also received 200 mcg of intraarterial nitroglycerin and 5  mg of diluted verapamil via the right radial sheath. Then a 5-Syriac  TIG diagnostic catheter was advanced to the ascending aorta with  difficulty due to angulated right subclavian artery. The left main  coronary artery was engaged with difficulty and a coronary angiogram was  done. This catheter failed to engage the right coronary artery. It was  exchanged over a guidewire to a Open Lendingra AR-2 diagnostic catheter which  was able to engage the right coronary artery and a cholangiogram was  done. This catheter was exchanged over a guidewire to a 5-Syriac  pigtail which was advanced into the left ventricle without difficulty. The left ventricular end-diastolic pressure was measured. Left  ventriculogram was done. There was no significant gradient across the  aortic valve. FINDINGS:    HEMODYNAMIC RESULTS:  Aortic pressure 116/66 mmHg. Left ventricular end-diastolic pressure 27 mmHg. CORONARY ANATOMY:  LEFT MAIN:  It is a large and short artery with no angiographic stenosis  noted. LAD:  It is small to medium in size and wraps around the apex. It gives  rise to two small diagonal branches and several small septal  perforators. There was 95% discrete mid LAD stenosis with RADHA-1 to -2  flow distally. CIRCUMFLEX:  It is a large dominant artery giving rise to first obtuse  marginal which has high takeoff and has 70% tubular proximal stenosis. It also gives rise to a very small second obtuse marginal branch. It  also gives rise to a PDA and a PLV branch. RIGHT CORONARY ARTERY:  It is a small nondominant artery giving rise to  an RV marginal branch. There was a 90% proximal to mid stenosis. Left ventriculogram revealed apical moderate-to-severe hypokinesis with  an ejection fraction of 50%. No mitral regurgitation was noted. CORONARY INTERVENTION NOTE:  A 6-Yoruba EBU 3.0 guide catheter was  unable to be advanced into the radial artery due to spasm. The patient  received intraarterial nitroglycerin and verapamil and was given  fentanyl and Versed without success due to spasm. Then I tried multiple  5-Yoruba guide catheters including an EBU and a Stacia left, both were  3.0 but they both could not engage the left main. Finally, I retried  again with 6-Yoruba EBU 3.0 guide catheter which was able to engage the  left main artery with difficulty due to very angulated right subclavian  artery and due to short ascending aortic root. Then a Runthrough wire  was advanced to the distal LAD without difficulty. Then a 2.0 x 12 [place]  Monorail balloon was inflated twice at 12 atmospheres at the mid LAD. Then a 2.5 x 26 Resolute Emeka stent was deployed at 12 atmospheres.    This stent was postdilated using a 2.5 x 20 noncompliant balloon  inflated twice at 12 atmospheres with 0% residual stenosis and RADHA-3 flow distally. At the end of the procedure, the wire and the guide  catheter were pulled out. The patient's ACT was 293. The patient had  received a bolus of Plavix this morning and a baby aspirin. The Terumo  Slender sheath was pulled out and a Vasc Band was applied over the right  radial artery with good hemostasis. The patient tolerated the procedure  very well and no complications were noted. No significant blood loss   Occurred during the procedure. IMPRESSION:  1.  95% discrete mid LAD stenosis, status post successful PCI using  drug-eluting stent. 2.  70% tubular proximal OM1 stenosis. 3.  90% mid stenosis in a small nondominant RCA. 4.  Elevated LVEDP at 28 mmHg. 5.  Apical hypokinesis with an ejection fraction of 50%. RECOMMENDATIONS:  1. DAPT. 2.  Aggressive medical therapy. 3.  Cardiac rehab post hospital discharge. 4.  Consider PCI of OM if recurrence of chest discomfort. PROCEDURE START TIME:  1606 hours. PROCEDURE END TIME:  15:14 pm hours. CONTRAST VOLUME:  185 mL of Optiray. FLUOROSCOPY TIME:  21 minutes. CONSCIOUS SEDATION:  3 mg of intravenous Versed and 150 mcg of  intravenous fentanyl.         Theodor Goodpasture, MD    D: 01/29/2021 17:43:03       T: 01/29/2021 17:52:20     [email or web address]  Job#: [id number]     Doc#: [id number]    CC:

## 2021-01-30 NOTE — H&P
swelling, deformity or tenderness  Neurological: alert, oriented, normal speech, no focal findings or movement disorder noted  Skin: warm and dry, no rash or erythema    LABS:  Recent Results (from the past 24 hour(s))   Troponin    Collection Time: 01/28/21 10:25 PM   Result Value Ref Range    Troponin 0.62 (H) 0.00 - 0.03 ng/mL   EKG 12 Lead    Collection Time: 01/28/21 11:16 PM   Result Value Ref Range    Ventricular Rate 88 BPM    Atrial Rate 88 BPM    P-R Interval 146 ms    QRS Duration 104 ms    Q-T Interval 372 ms    QTc Calculation (Bazett) 450 ms    R Axis -25 degrees    T Axis -11 degrees   Comprehensive Metabolic Panel    Collection Time: 01/29/21  6:39 AM   Result Value Ref Range    Sodium 136 132 - 146 mmol/L    Potassium 3.9 3.5 - 5.0 mmol/L    Chloride 104 98 - 107 mmol/L    CO2 25 22 - 29 mmol/L    Anion Gap 7 7 - 16 mmol/L    Glucose 101 (H) 74 - 99 mg/dL    BUN 7 (L) 8 - 23 mg/dL    CREATININE 0.8 0.5 - 1.0 mg/dL    GFR Non-African American >60 >=60 mL/min/1.73    GFR African American >60     Calcium 8.6 8.6 - 10.2 mg/dL    Total Protein 6.9 6.4 - 8.3 g/dL    Albumin 3.4 (L) 3.5 - 5.2 g/dL    Total Bilirubin 0.5 0.0 - 1.2 mg/dL    Alkaline Phosphatase 100 35 - 104 U/L    ALT 34 (H) 0 - 32 U/L    AST 79 (H) 0 - 31 U/L   TSH without Reflex    Collection Time: 01/29/21  6:39 AM   Result Value Ref Range    TSH 2.100 0.270 - 4.200 uIU/mL   Magnesium    Collection Time: 01/29/21  6:39 AM   Result Value Ref Range    Magnesium 2.2 1.6 - 2.6 mg/dL   Phosphorus    Collection Time: 01/29/21  6:39 AM   Result Value Ref Range    Phosphorus 2.5 2.5 - 4.5 mg/dL   Lipid Panel    Collection Time: 01/29/21  6:39 AM   Result Value Ref Range    Cholesterol, Total 177 0 - 199 mg/dL    Triglycerides 89 0 - 149 mg/dL    HDL 62 >40 mg/dL    LDL Calculated 97 0 - 99 mg/dL    VLDL Cholesterol Calculated 18 mg/dL   Hemoglobin A1C    Collection Time: 01/29/21  6:39 AM   Result Value Ref Range    Hemoglobin A1C 5.8 (H) 4.0 - 5.6 %   Troponin    Collection Time: 01/29/21  6:39 AM   Result Value Ref Range    Troponin 0.82 (H) 0.00 - 0.03 ng/mL   TYPE AND SCREEN    Collection Time: 01/29/21  1:00 PM   Result Value Ref Range    ABO/Rh A POS     Antibody Screen POS    ANTIBODY IDENTIFICATION    Collection Time: 01/29/21  1:00 PM   Result Value Ref Range    Antibody ID POS, Anti-IH    Doctor Notification    Collection Time: 01/29/21  1:00 PM   Result Value Ref Range    DR. JAMES COMPL    DIRECT ANTIGLOBULIN TEST    Collection Time: 01/29/21  1:00 PM   Result Value Ref Range    Polyspecific Dheeraj NEG    POC ACT-Low Range    Collection Time: 01/29/21  5:14 PM   Result Value Ref Range    POC ACT  Not established seconds       RADIOLOGY:  Echo Complete    Result Date: 1/29/2021  Transthoracic Echocardiography Report (TTE)  Demographics   Patient Name    Candy Costa Gender            Female                  K   Medical Record  [id number]      Room Number       07  Number   Account #       [de-identified]     Procedure Date    01/29/2021   Corporate ID                  Ordering                                Physician   Accession       [id number]    Referring  Number                        Physician   Date of Birth   1955    [address] [facility]   Age             77 year(s)    Interpreting      [address]                                Physician         Physician Cardiology                                                  Michael Velasquez MD                                 Any Other  Procedure Type of Study   TTE procedure  Procedure Date Date: 01/29/2021 Start: 10:25 AM Study Location: Echo Lab Technical Quality: Adequate visualization Indications:NSTEMI. Patient Status: Routine Height: 61 inches Weight: 208 pounds BSA: 1.92 m^2 BMI: 39.3 kg/m^2 Rhythm: Within normal limits HR: 77 bpm BP: 138/79 mmHg  Findings   Left Ventricle  Mild left ventricle hypertrophy. Normal left ventricular systolic function. Visually estimated LVEF is 60-65 %. No wall motion abnormalities. Right Ventricle  Normal right ventricle structure and function. Left Atrium  Normal left atrial size. Mitral Valve  Normal mitral valve structure and function. No mitral valve regurgitation. Tricuspid Valve  Not well visualized. Aortic Valve  Normal leaflets structure and mobility. No aortic regurgitation. Pericardial Effusion  No pericardial effusion. Conclusions   Summary  Technically difficult examination. Mild left ventricle hypertrophy. Normal left ventricular systolic function. Visually estimated LVEF is 60-65 %. No wall motion abnormalities. Normal right ventricle structure and function.    Signature   ----------------------------------------------------------------  Electronically signed by Karley Bolivar MD(Interpreting  physician) on 01/29/2021 01:06 PM  ----------------------------------------------------------------  M-Mode/2D Measurements & Calculations   LV Diastolic    LV Systolic Dimension: 2.9   AV Cusp Separation: 1.9 cmLA  Dimension: 3.7  cm                           Dimension: 3.2 cmAO Root  cm              LV Volume Diastolic: 71.0 ml Dimension: 2.9 cm  LV FS:21.6 %    LV Volume Systolic: 98.8 ml  LV PW           LV EDV/LV EDV Index: 93.4  Diastolic: 1.1  QH/23 PZ/D^0YW ESV/LV ESV  cm              Index: 31.8 ml/17ml/ m^2     RV Diastolic Dimension: 2.3  Septum          EF Calculated: 45.7 %        cm  Diastolic: 1.2  LV Mass Index: 72 l/min*m^2  cm  CO: 4.45 l/min                               LA volume/Index: 50.8 ml  CI: 2.32        LVOT: 2 cm  l/m*m^2  LV Mass: 138.17  g  Doppler Measurements & Calculations   MV Peak E-Wave: 0.67 AV Peak Velocity:     LVOT Peak Velocity: 0.82 m/s  m/s                  1.08 m/s              LVOT Mean Velocity: 0.59 m/s  MV Peak A-Wave: 0.83 AV Peak Gradient:     LVOT Peak Gradient: 2.7  m/s                  4.66 mmHg             mmHgLVOT Mean Gradient: 1.6  MV E/A Ratio: 0. 81   AV Mean Velocity:     mmHg  MV Peak Gradient:    0.76 m/s  2.7 mmHg             AV Mean Gradient: 2.7  MV Mean Gradient:    mmHg  1.6 mmHg             AV VTI: 21.4 cm  MV Mean Velocity:    AV Area  0.61 m/s             (Continuity):2.7 cm^2 PV Peak Velocity: 0.88 m/s  MV Deceleration                            PV Peak Gradient: 3.06 mmHg  Time: 210 msec       LVOT VTI: 18.4 cm     PV Mean Velocity: 0.67 m/s  MV P1/2t: 52.5 msec  IVRT: 103.8 msec      PV Mean Gradient: 1.9 mmHg  MVA by PHT:4.19 cm^2  MV Area  (continuity): 2.7  cm^2  [id number]%3d%3d    Cta Pulmonary W Contrast    Result Date: 1/28/2021  EXAMINATION: CTA OF THE CHEST 1/28/2021 4:56 pm TECHNIQUE: CTA of the chest was performed after the administration of intravenous contrast.  Multiplanar reformatted images are provided for review. MIP images are provided for review. Dose modulation, iterative reconstruction, and/or weight based adjustment of the mA/kV was utilized to reduce the radiation dose to as low as reasonably achievable. COMPARISON: None. HISTORY: ORDERING SYSTEM PROVIDED HISTORY: Chest pain, hx of COVID, concern for possible PE TECHNOLOGIST PROVIDED HISTORY: Reason for exam:->Chest pain, hx of COVID, concern for possible PE Decision Support Exception->Emergency Medical Condition (MA) FINDINGS: Pulmonary Arteries: Pulmonary arteries are adequately opacified for evaluation. No evidence of intraluminal filling defect to suggest pulmonary embolism. Main pulmonary artery is normal in caliber. Mediastinum: No evidence of mediastinal lymphadenopathy. The heart and pericardium demonstrate no acute abnormality. There is no acute abnormality of the thoracic aorta. Lungs/pleura: The lungs are without acute process. No focal consolidation or pulmonary edema. No evidence of pleural effusion or pneumothorax.  Upper Abdomen: Small hiatal hernia. 3.3 cm left renal peripelvic cyst. Soft Tissues/Bones: No acute bone or soft tissue abnormality. Degenerative changes thoracic spine. No evidence of pulmonary embolism or acute pulmonary abnormality. Small hiatal hernia. 3.3 cm left renal peripelvic cyst.      ASSESSMENT AND PLAN  Active Problems:    CAD in native artery  Resolved Problems:    * No resolved hospital problems. *    Impression  1. NSTEMI, Patient was noted to have 95% discrete mid LAD stenosis and she is now status post successful PCI using drug-eluting stent  2. CAD  3. Chest pain  4. Full code     Plan  · At this point patient is recommended to have dual antiplatelet therapy cardiac rehab post hospital discharge and possibility of PCI of OM if recurrence of chest discomfort, patient still has 70% tubular proximal om1 stenosis    DVT prophylaxis: Subcutaneous heparin  CODE STATUS: Patient is a full code  Discharge plan: Patient can be discharged next 3 to 4 days to home with and without home health.,  Pending clinical improvement, pending work-up and clearance by consulting services. Please note that over 35 minutes was spent in evaluating the patient, review of records and results, discussion with staff/family, etc.    Gilford Crane, MD  [facility]   [phone]    NOTE: This report was transcribed using voice recognition software. Every effort was made to ensure accuracy; however, inadvertent computerized transcription errors may be present.

## 2021-01-30 NOTE — PROGRESS NOTES
Inpatient Cardiology Follow-up      Reason for Consult:  S/p PCI    Interval HPI:    Vital stable overnight. No acute events or complaints   Labs this morning unremarkable   Asymptomatic during ambulation      PHYSICAL EXAM:  BP (!) 105/55   Pulse 87   Temp 98.7 °F (37.1 °C) (Temporal)   Resp 16   Ht 5' 1\" (1.549 m)   Wt 208 lb (94.3 kg)   SpO2 98%   BMI 39.30 kg/m²     General Appearance:    Alert, cooperative, no distress, appears stated age    Head:    Normocephalic, without obvious abnormality, atraumatic    Eyes:    PERRL, conjunctiva/corneas clear, EOM's intact    Neck:   Supple, symmetrical, trachea midline; no carotid    bruit or JVP    Lungs:     Clear to auscultation bilaterally, respirations unlabored, no wheezing, rales or rhonchi    Heart:    Regular rate and rhythm, no murmur, rub   or gallop    Abdomen:     Soft, non-tender, non-distended    Extremities:   Extremities normal, atraumatic, no cyanosis or edema    Skin:   Skin color, texture, turgor normal for age    Neurologic:   Oriented x3, CNII-XII intact.  Normal gross strength and sensation       DATA:      Labs:   CBC:   Recent Labs     01/29/21 2248 01/30/21  0550   WBC 12.1* 9.5   HGB 13.5 13.5   HCT 39.9 41.5    361     BMP:   Recent Labs     01/29/21  0639 01/30/21  0550    136   K 3.9 4.0   CO2 25 23   BUN 7* 7*   CREATININE 0.8 0.8   LABGLOM >60 >60   CALCIUM 8.6 8.8     Mag:   Recent Labs     01/29/21  0639 01/30/21  0550   MG 2.2 2.2     Phos:   Recent Labs     01/29/21  0639   PHOS 2.5     TSH:   Recent Labs     01/29/21  0639   TSH 2.100     HgA1c:   Lab Results   Component Value Date    LABA1C 5.8 (H) 01/29/2021     PT/INR:   Recent Labs     01/28/21  1700   PROTIME 11.6   INR 1.0     APTT:  Recent Labs     01/29/21  2248 01/30/21  0550   APTT 55.3* 30.6     CARDIAC ENZYMES:  Recent Labs     01/28/21  2225 01/29/21  0639 01/29/21  2248   TROPONINI 0.62* 0.82* 2.04*     FASTING LIPID PANEL:  Lab Results Component Value Date    CHOL 177 01/29/2021    HDL 62 01/29/2021    LDLCALC 97 01/29/2021    TRIG 89 01/29/2021     LIVER PROFILE:  Recent Labs     01/28/21  1700 01/29/21  0639   AST 47* 79*   ALT 25 34*   LABALBU 3.7 3.4*     Personally reviewed coronary angiogram and PCI:  Large OM with tubular 60% stenosis  Culprit was mid LAD 90% thrombotic lesion status post PCI with single TRINH with good angiographic result. There is a proximal hazy lesion of indeterminate severity in the LAD    ASSESSMENT:  1. NSTEMI status post PCI to the LAD with residual OM disease (Dr. Woods [place], 1/29/2021)  a. TTE per report normal LV size systolic function no significant valve disease  2. Morbid obesity, BMI approximately 40  3. Hyperlipidemia  4. PLAN:   Continue aspirin 81 mg daily and clopidogrel 75 mg daily for at least 6 months   Continue high intensity statin   Continue Toprol-XL   Inpatient followed by outpatient cardiac rehab   If asymptomatic today may be discharged from a cardiac standpoint   Follow-up with Dr. Jessica Norman 4 weeks post discharge       We will sign off. Please call us with any questions or concerns. Thank you for the consult.     Becca Ruth MD, [facility]  Interventional Cardiology/Structural Heart Disease        Electronically signed by Becca Ruth MD on 1/30/2021 at 9:35 AM

## 2021-01-30 NOTE — DISCHARGE SUMMARY
[address]                               DISCHARGE SUMMARY    PATIENT NAME: Natanael Knight                    :        1955  MED REC NO:   [id number]                            ROOM:       07  ACCOUNT NO:   [de-identified]                           ADMIT DATE: 2021  PROVIDER:     Zo Jimenez DO                  DISCHARGE DATE:  2021    ADMITTING DIAGNOSIS:  Chest pain. FINAL DISCHARGE DIAGNOSES:  Chest pain secondary to non-ST-elevated  myocardial infarction. 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 COMPLICATION:  Ventricular fibrillation. OPERATIONS:  None. CONSULTATIONS OBTAINED:  [facility]. ADMITTING PHYSICIANS:  Dr. Maggy Kenney and Dr. Enid Cardenas. CHIEF COMPLAINT AND HISTORY OF CHIEF COMPLAINT:  A 40-year-old white  female who was admitted to [address] [place] [address]. The patient presented  to the hospital here through the emergency room on 2021. The  patient presents to the hospital with what appeared to be an episode of  chest pain and discomfort. This was going on for approximately the past  2 days. Associated with this has been progressively more  symptomatology. The patient stated she got progressively worse. She  described it as a tightness and pressure sensation. The patient  presented to the hospital here, was seen and evaluated. She, at this  time, was admitted to the hospital to the immediate care unit with the  diagnosis of chest pain, non-ST-elevated myocardial infarction. PAST MEDICAL HISTORY:  Positive for history of a cyst in the left  kidney, diverticulitis, history of blood transfusion, phlebitis.      PHYSICAL EXAMINATION: VITAL SIGNS:  Showed blood pressure to be 136/84, pulse 86, respirations  20. Afebrile. GENERAL APPEARANCE:  This is a 75-year-old white female, who is alert  and responsive. HEENT:  Normal to gross visual inspection. NECK:  Revealed adequate carotid upstrokes without associated bruits. Trachea midline. Thyroid not palpable. LUNGS:  Coarse without any significant abnormalities. ABDOMEN:  Soft. No guarding, rebound, rigidity. EXTREMITIES:  Without any cyanosis, clubbing, edema. While the patient was in the hospital, she had the following laboratory  studies performed:  Cholesterol 177, HDL and LDL were 62 and 97  respectively. BUN and creatinine were 7 and 0.8 respectively. Electrolytes were satisfactory. AST and ALT were 79 and 34  respectively. TSH was 2.12. Her EKG did show normal sinus rhythm. Troponin level was elevated at 0.62.  3.3 left renal pelvis cyst was  noted. CMP was normal.    HOSPITAL COURSE OF STAY:  The patient was admitted directly to the  hospital to the general telemetry floor. The patient was seen and  evaluated. Because of the bed situation, the patient was kept in the ER  on hold at this time. The patient was started on a lipid-lowering  agent, beta-blocker with Lovenox and aspirin. The patient's lab work  was followed accordingly. Her troponin level remained elevated. She  was seen by [facility], Dr. Ze Murphy, on 01/29, and because of the  patient's symptomatology, was felt to be transferred to [facility] for cardiac catheterization. The patient, at this time, was  hemodynamically stable. She was seen by my nurse practitioner at that  time of Isaac Pat. The patient, at this time, did develop some  ventricular fibrillation, which spontaneously resolved. The patient was  started on Cordarone. She remained hemodynamically stable and was  transferred for cardiac catheterization on 01/29. At the time of discharge, the patient's vital signs were stable;  revealed the following:  Her blood pressure was 122/78, pulse 69,  respirations 18. She was afebrile. O2 sat was 94%. The patient was  maintained on the medications at the time of transfer consisting of  Tylenol q.4 p.r.n., Cordarone infusion at 0.5 mg per minute, Lipitor 80  mg daily, aspirin 325 daily, Lopressor 25 b.i.d., nitro p.r.n., GlycoLax  17 gm p.o. daily. At the time of discharge, the patient was improved. Her troponin level  was still elevated at 0.62. The patient was improved at this time. Pending results, she was transferred by mobile intensive care unit to  [facility].  At the time of discharge, the patient was  stable; although, her prognosis is still guarded pending results of  cardiac catheterization. More than 40 minutes were spent on the day of  discharge coordinating transfer, discussion with other consultants,  along with face-to-face evaluation with my nurse practitioner, Marge Gaucher on the day of discharge.         Zoraida Guerrero DO    D: 01/29/2021 12:09:22       T: 01/30/2021 10:53:12     [id number]  Job#: [id number]     Doc#: [id number]    CC:

## 2021-01-30 NOTE — PROGRESS NOTES
Hospitalist Progress Note      SYNOPSIS: Patient admitted on 2021   77years old female who was at [facility] [address], she was admitted there for NSTEMI, she was transferred to [facility] for cardiac catheterization, patient was noted to have 95% discrete mid LAD stenosis and she is now status post successful PCI using drug-eluting stent, at this point patient is recommended to have dual antiplatelet therapy cardiac rehab post hospital discharge and possibility of PCI of OM if recurrence of chest discomfort, patient still has 70% tubular proximal om1 stenosis      SUBJECTIVE:    Patient seen and examined  Records reviewed. Temp (24hrs), Av.7 °F (36.5 °C), Min:96.8 °F (36 °C), Max:98.7 °F (37.1 °C)    DIET: DIET CARDIAC;  CODE: Full Code  No intake or output data in the 24 hours ending 21 1041    OBJECTIVE:    BP (!) 105/55   Pulse 87   Temp 98.7 °F (37.1 °C) (Temporal)   Resp 16   Ht 5' 1\" (1.549 m)   Wt 208 lb (94.3 kg)   SpO2 98%   BMI 39.30 kg/m²     General appearance: No apparent distress, appears stated age and cooperative. HEENT:  Conjunctivae/corneas clear. Neck: Supple. No jugular venous distention. Respiratory: Clear to auscultation bilaterally, normal respiratory effort  Cardiovascular: Regular rate rhythm, normal S1-S2  Abdomen: Soft, nontender, nondistended  Musculoskeletal: No clubbing, cyanosis, no bilateral lower extremity edema. Brisk capillary refill.    Skin:  No rashes  on visible skin  Neurologic: awake, alert and following commands     ASSESSMENT:  NSTEMI  Coronary artery disease       PLAN:  Cardiology following  Continue aspirin, atorvastatin, Plavix, metoprolol      Medications:  REVIEWED DAILY    Infusion Medications   Scheduled Medications    sodium chloride flush  10 mL Intravenous 2 times per day    aspirin  81 mg Oral Daily    clopidogrel  75 mg Oral Daily    metoprolol succinate  25 mg Oral Daily    atorvastatin  40 mg Oral Nightly     PRN Meds: sodium chloride flush, acetaminophen    Labs:     Recent Labs     01/28/21  1700 01/29/21  2248 01/30/21  0550   WBC 10.7 12.1* 9.5   HGB 15.1 13.5 13.5   HCT 45.5 39.9 41.5    366 361       Recent Labs     01/28/21  1700 01/29/21  0639 01/30/21  0550    136 136   K 4.2 3.9 4.0    104 103   CO2 26 25 23   BUN 8 7* 7*   CREATININE 0.7 0.8 0.8   CALCIUM 9.2 8.6 8.8   PHOS  --  2.5  --        Recent Labs     01/28/21  1700 01/29/21  0639   PROT 7.4 6.9   ALKPHOS 107* 100   ALT 25 34*   AST 47* 79*   BILITOT 0.4 0.5   LIPASE 29  --        Recent Labs     01/28/21 1700   INR 1.0       Recent Labs     01/28/21  2225 01/29/21  0639 01/29/21 2248   TROPONINI 0.62* 0.82* 2.04*       Chronic labs:    Lab Results   Component Value Date    CHOL 177 01/29/2021    TRIG 89 01/29/2021    HDL 62 01/29/2021    LDLCALC 97 01/29/2021    TSH 2.100 01/29/2021    INR 1.0 01/28/2021    LABA1C 5.8 (H) 01/29/2021       Radiology: REVIEWED DAILY    +++++++++++++++++++++++++++++++++++++++++++++++++  [address], [place]  +++++++++++++++++++++++++++++++++++++++++++++++++  NOTE: This report was transcribed using voice recognition software. Every effort was made to ensure accuracy; however, inadvertent computerized transcription errors may be present.

## 2021-01-31 NOTE — DISCHARGE SUMMARY
Hospitalist Discharge Summary    Patient ID: Augustus Rey   Patient : 1955  Patient's PCP: Christian Tucker MD    Admit Date: 2021   Admitting Physician: Gwendolyn Ann DO    Discharge Date:  2021   Discharge Physician: Gwendolyn Ann DO   Discharge Condition: Stable  Discharge Disposition: [facility] course in brief:  (Please refer to daily progress notes for a comprehensive review of the hospitalization by requesting medical records)    Personally reviewed coronary angiogram and PCI:  Large OM with tubular 60% stenosis  Culprit was mid LAD 90% thrombotic lesion status post PCI with single TRINH with good angiographic result. There is a proximal hazy lesion of indeterminate severity in the LAD     ASSESSMENT:  1. NSTEMI status post PCI to the LAD with residual OM disease (Dr. Payam Laguna, 2021)  a. TTE per report normal LV size systolic function no significant valve disease  2. Morbid obesity, BMI approximately 40  3. Hyperlipidemia  4.       PLAN:  · Continue aspirin 81 mg daily and clopidogrel 75 mg daily for at least 6 months  · Continue high intensity statin  · Continue Toprol-XL  · Inpatient followed by outpatient cardiac rehab  · If asymptomatic today may be discharged from a cardiac standpoint  · Follow-up with Dr. Muriel Contreras 4 weeks post discharge  ·        Consults:   IP CONSULT TO CARDIAC REHAB  IP CONSULT TO CARDIOLOGY    Discharge Diagnoses:  NSTEMI  Coronary artery disease         Discharge Instructions / Follow up:    No future appointments.     Continued appropriate risk factor modification of blood pressure, diabetes and serum lipids will remain essential to reducing risk of future atherosclerotic development    Activity: activity as tolerated    Significant labs:  CBC:   Recent Labs     21  1700 21  2248 21  0550   WBC 10.7 12.1* 9.5   RBC 4.94 4.42 4.48   HGB 15.1 13.5 13.5   HCT 45.5 39.9 41.5   MCV 92.1 90.3 92.6   RDW 14.5 15.0 15.0    366 361 BMP:   Recent Labs     01/28/21  1700 01/29/21  0639 01/30/21  0550    136 136   K 4.2 3.9 4.0    104 103   CO2 26 25 23   BUN 8 7* 7*   CREATININE 0.7 0.8 0.8   MG  --  2.2 2.2   PHOS  --  2.5  --      LFT:  Recent Labs     01/28/21  1700 01/29/21  0639   PROT 7.4 6.9   ALKPHOS 107* 100   ALT 25 34*   AST 47* 79*   BILITOT 0.4 0.5   LIPASE 29  --      PT/INR:   Recent Labs     01/28/21  1700 01/29/21  2248 01/30/21  0550   INR 1.0  --   --    APTT 30.1 55.3* 30.6     BNP: No results for input(s): BNP in the last 72 hours.   Hgb A1C:   Lab Results   Component Value Date    LABA1C 5.8 (H) 01/29/2021     Folate and B12: No results found for: Pan Stairs, No results found for: FOLATE  Thyroid Studies:   Lab Results   Component Value Date    TSH 2.100 01/29/2021       Urinalysis:    Lab Results   Component Value Date    NITRU NEGATIVE 07/31/2012    WBCUA 2-5 07/31/2012    BACTERIA RARE 07/31/2012    RBCUA 1-3 07/31/2012    BLOODU TRACE 07/31/2012    SPECGRAV 1.025 07/31/2012    GLUCOSEU NEGATIVE 07/31/2012       Imaging:  Echo Complete    Result Date: 1/29/2021  Transthoracic Echocardiography Report (TTE)  Demographics   Patient Name    Nichole Stanley Gender            Female                  K   Medical Record  [id number]      Room Number       07  Number   Account #       [de-identified]     Procedure Date    01/29/2021   Corporate ID                  Ordering                                Physician   Accession       [id number]    Referring  Number                        Physician   Date of Birth   1955    [address] [facility]   Age             77 year(s)    Interpreting      [address]                                Physician         Physician Cardiology                                                  Kathleen Castillo MD                                 Any Other  Procedure Type of Study   TTE procedure  Procedure Date Date: 01/29/2021 Start: 10:25 AM Study Location: Echo Lab Technical Quality: Adequate visualization Indications:NSTEMI. Patient Status: Routine Height: 61 inches Weight: 208 pounds BSA: 1.92 m^2 BMI: 39.3 kg/m^2 Rhythm: Within normal limits HR: 77 bpm BP: 138/79 mmHg  Findings   Left Ventricle  Mild left ventricle hypertrophy. Normal left ventricular systolic function. Visually estimated LVEF is 60-65 %. No wall motion abnormalities. Right Ventricle  Normal right ventricle structure and function. Left Atrium  Normal left atrial size. Mitral Valve  Normal mitral valve structure and function. No mitral valve regurgitation. Tricuspid Valve  Not well visualized. Aortic Valve  Normal leaflets structure and mobility. No aortic regurgitation. Pericardial Effusion  No pericardial effusion. Conclusions   Summary  Technically difficult examination. Mild left ventricle hypertrophy. Normal left ventricular systolic function. Visually estimated LVEF is 60-65 %. No wall motion abnormalities. Normal right ventricle structure and function.    Signature   ----------------------------------------------------------------  Electronically signed by Robert Reddy MD(Interpreting  physician) on 01/29/2021 01:06 PM  ----------------------------------------------------------------  M-Mode/2D Measurements & Calculations   LV Diastolic    LV Systolic Dimension: 2.9   AV Cusp Separation: 1.9 cmLA  Dimension: 3.7  cm                           Dimension: 3.2 cmAO Root  cm              LV Volume Diastolic: 64.9 ml Dimension: 2.9 cm  LV FS:21.6 %    LV Volume Systolic: 63.3 ml  LV PW           LV EDV/LV EDV Index: 74.9  Diastolic: 1.1  WS/79 IL/E^0CS ESV/LV ESV  cm              Index: 31.8 ml/17ml/ m^2     RV Diastolic Dimension: 2.3  Septum          EF Calculated: 45.7 %        cm  Diastolic: 1.2  LV Mass Index: 72 l/min*m^2  cm  CO: 4.45 l/min                               LA volume/Index: 50.8 ml  CI: 2.32        LVOT: 2 cm  l/m*m^2  LV Mass: 138.17  g  Doppler Measurements & Calculations   MV Peak E-Wave: 0.67 AV Peak Velocity:     LVOT Peak Velocity: 0.82 m/s  m/s                  1.08 m/s              LVOT Mean Velocity: 0.59 m/s  MV Peak A-Wave: 0.83 AV Peak Gradient:     LVOT Peak Gradient: 2.7  m/s                  4.66 mmHg             mmHgLVOT Mean Gradient: 1.6  MV E/A Ratio: 0.81   AV Mean Velocity:     mmHg  MV Peak Gradient:    0.76 m/s  2.7 mmHg             AV Mean Gradient: 2.7  MV Mean Gradient:    mmHg  1.6 mmHg             AV VTI: 21.4 cm  MV Mean Velocity:    AV Area  0.61 m/s             (Continuity):2.7 cm^2 PV Peak Velocity: 0.88 m/s  MV Deceleration                            PV Peak Gradient: 3.06 mmHg  Time: 210 msec       LVOT VTI: 18.4 cm     PV Mean Velocity: 0.67 m/s  MV P1/2t: 52.5 msec  IVRT: 103.8 msec      PV Mean Gradient: 1.9 mmHg  MVA by PHT:4.19 cm^2  MV Area  (continuity): 2.7  cm^2  [email or web address] [id number]    Cta Pulmonary W Contrast    Result Date: 1/28/2021  EXAMINATION: CTA OF THE CHEST 1/28/2021 4:56 pm TECHNIQUE: CTA of the chest was performed after the administration of intravenous contrast.  Multiplanar reformatted images are provided for review. MIP images are provided for review. Dose modulation, iterative reconstruction, and/or weight based adjustment of the mA/kV was utilized to reduce the radiation dose to as low as reasonably achievable. COMPARISON: None. HISTORY: ORDERING SYSTEM PROVIDED HISTORY: Chest pain, hx of COVID, concern for possible PE TECHNOLOGIST PROVIDED HISTORY: Reason for exam:->Chest pain, hx of COVID, concern for possible PE Decision Support Exception->Emergency Medical Condition (MA) FINDINGS: Pulmonary Arteries: Pulmonary arteries are adequately opacified for evaluation. No evidence of intraluminal filling defect to suggest pulmonary embolism. Main pulmonary artery is normal in caliber.  Mediastinum: No evidence of mediastinal lymphadenopathy. The heart and pericardium demonstrate no acute abnormality. There is no acute abnormality of the thoracic aorta. Lungs/pleura: The lungs are without acute process. No focal consolidation or pulmonary edema. No evidence of pleural effusion or pneumothorax. Upper Abdomen: Small hiatal hernia. 3.3 cm left renal peripelvic cyst. Soft Tissues/Bones: No acute bone or soft tissue abnormality. Degenerative changes thoracic spine. No evidence of pulmonary embolism or acute pulmonary abnormality. Small hiatal hernia. 3.3 cm left renal peripelvic cyst.      Discharge Medications:      Medication List      START taking these medications    aspirin 81 MG EC tablet  Take 1 tablet by mouth daily     atorvastatin 40 MG tablet  Commonly known as: LIPITOR  Take 1 tablet by mouth nightly     clopidogrel 75 MG tablet  Commonly known as: PLAVIX  Take 1 tablet by mouth daily     metoprolol succinate 25 MG extended release tablet  Commonly known as: TOPROL XL  Take 1 tablet by mouth daily           Where to Get Your Medications      These medications were sent to [address] [phone]  [address] [place] [id number]    Phone: [phone]   · aspirin 81 MG EC tablet  · atorvastatin 40 MG tablet  · clopidogrel 75 MG tablet  · metoprolol succinate 25 MG extended release tablet         Time Spent on discharge is more than 45 minutes in the examination, evaluation, counseling and review of medications and discharge plan.    +++++++++++++++++++++++++++++++++++++++++++++++++  Denise Mohr, [address], [place]  +++++++++++++++++++++++++++++++++++++++++++++++++  NOTE: This report was transcribed using voice recognition software.  Every effort was made to ensure accuracy; however, inadvertent computerized transcription errors may be present.

## 2021-02-10 NOTE — PROGRESS NOTES
[facility] Cardiology Progress Note  Dr. Melissa Esposito      Referring Physician: Elroy Jiménez MD  CHIEF COMPLAINT:   Chief Complaint   Patient presents with    Follow-Up from [address] [place] [address]/stents. Patient was in [facility] and transferred to [facility] for MI. Patient had stents. Patient denies any cp sob or dizziness. HISTORY OF PRESENT ILLNESS:   77year old female with history of CAD s/p PCI to LAD, hyperlipidemia is here for a follow up visit. Patient denies any chest pain, no shortness of breath, no lightheadedness, no dizziness, no palpitations, no pedal edema, no PND, no orthopnea, no syncope, no presyncopal episodes.   Denies noncompliance with dual antiplatelet therapy    Past Medical History:   Diagnosis Date    CAD (coronary artery disease)     Cyst of left kidney     x 2    Diverticulitis     Gallbladder problem 01/2020    History of blood transfusion 40 years ago    Hyperlipidemia     Phlebitis 40 years ago    while hospitalized    PONV (postoperative nausea and vomiting)          Past Surgical History:   Procedure Laterality Date    CHOLECYSTECTOMY, LAPAROSCOPIC N/A 2/4/2020    LAPAROSCOPIC ROBOTIC XI ASSISTED CHOLECYSTECTOMY performed by Franco Mayer MD at [facility]  01/29/2021    Dr Violetta Stevenson Gilford Eland resolute yue 2.5x26 in the Mid LAD    [address], VAGINAL  2012    SPLENECTOMY, TOTAL  1979    TOE SURGERY  2000    TONSILLECTOMY           Current Outpatient Medications   Medication Sig Dispense Refill    aspirin 81 MG EC tablet Take 1 tablet by mouth daily 30 tablet 3    atorvastatin (LIPITOR) 40 MG tablet Take 1 tablet by mouth nightly 30 tablet 3    metoprolol succinate (TOPROL XL) 25 MG extended release tablet Take 1 tablet by mouth daily 30 tablet 3    clopidogrel (PLAVIX) 75 MG tablet Take 1 tablet by mouth daily 30 tablet 3     No current facility-administered medications for this visit.           Allergies as of 02/11/2021 - Review Complete 02/11/2021   Allergen Reaction Noted    Latex Itching and Rash 02/03/2020    Aspirin Nausea And Vomiting and Other (See Comments) 02/22/2017    Chloraprep one step [chlorhexidine gluconate] Itching and Rash 02/17/2020       Social History     Socioeconomic History    Marital status:      Spouse name: Not on file    Number of children: Not on file    Years of education: Not on file    Highest education level: Not on file   Occupational History    Not on file   Social Needs    Financial resource strain: Not on file    Food insecurity     Worry: Not on file     Inability: Not on file   [employer] needs     Medical: Not on file     Non-medical: Not on file   Tobacco Use    Smoking status: Never Smoker    Smokeless tobacco: Never Used   Substance and Sexual Activity    Alcohol use: Never     Frequency: Never    Drug use: Never    Sexual activity: Not on file   Lifestyle    Physical activity     Days per week: Not on file     Minutes per session: Not on file    Stress: Not on file   Relationships    Social connections     Talks on phone: Not on file     Gets together: Not on file     Attends [employer] service: Not on file     Active member of club or organization: Not on file     Attends meetings of clubs or organizations: Not on file     Relationship status: Not on file    Intimate partner violence     Fear of current or ex partner: Not on file     Emotionally abused: Not on file     Physically abused: Not on file     Forced sexual activity: Not on file   Other Topics Concern    Not on file   Social History Narrative    Not on file       Family History   Problem Relation Age of Onset    Emphysema Father     Coronary Art Dis Sister 61        cabg    Heart Surgery Sister        REVIEW OF SYSTEMS:     CONSTITUTIONAL:  negative for  fevers, chills, sweats and fatigue  HEENT:  negative for  tinnitus, earaches, nasal congestion and epistaxis  RESPIRATORY:  negative for  dry cough, cough with sputum, wheezing and hemoptysis  GASTROINTESTINAL:  negative for nausea, vomiting, diarrhea, constipation, pruritus and jaundice  HEMATOLOGIC/LYMPHATIC:  negative for easy bruising, bleeding, lymphadenopathy and petechiae  ENDOCRINE:  negative for heat intolerance, cold intolerance, tremor, hair loss and diabetic symptoms including neither polyuria nor polydipsia nor blurred vision  MUSCULOSKELETAL:  negative for  myalgias, arthralgias, joint swelling, stiff joints and decreased range of motion  NEUROLOGICAL:  negative for memory problems, speech problems, visual disturbance, dysphagia, weakness and numbness      PHYSICAL EXAM:   CONSTITUTIONAL:  awake, alert, cooperative, no apparent distress, and appears stated age  HEAD:  normocepalic, without obvious abnormality, atraumatic, pink, moist mucous membranes. NECK:  Supple, symmetrical, trachea midline, no adenopathy, thyroid symmetric, not enlarged and no tenderness, skin normal  LUNGS:  No increased work of breathing, good air exchange, clear to auscultation bilaterally, no crackles or wheezing  CARDIOVASCULAR:  Normal apical impulse, regular rate and rhythm, normal S1 and S2, no S3 or S4, and no murmur noted and no JVD, no carotid bruit, no pedal edema, good carotid upstroke bilaterally. ABDOMEN:  Soft, nontender, no masses, no hepatomegaly or splenomegaly, BS+  CHEST: nontender to palpation, expands symmetrically  MUSCULOSKELETAL:  No clubbing no cyanosis. there is no redness, warmth, or swelling of the joints  full range of motion noted  NEUROLOGIC:  Alert, awake,oriented x3.   SKIN:  no bruising or bleeding, normal skin color, texture, turgor and no redness, warmth, or swelling        /60 (Site: Right Upper Arm, Position: Sitting, Cuff Size: Large Adult)   Pulse 84   Ht 5' 1\" (1.549 m)   Wt 206 lb (93.4 kg)   BMI 38.92 kg/m²     DATA:   I personally reviewed the visit EKG with the following interpretation: Sinus rhythm, incomplete right bundle branch block old anterior wall MI age undetermined, no significant changes when compared to previous    EKG 1/28/21 Normal sinus rhythm  Minimal voltage criteria for LVH, may be normal variant  Incomplete right bundle branch block  Non specific T wave abnormalities  When compared with ECG of 28-JAN-2021 16:14,  No significant changes    ECHO: 1/29/21     Summary   Technically difficult examination. Mild left ventricle hypertrophy. Normal left ventricular systolic function. Visually estimated LVEF is 60-65 %. No wall motion abnormalities. Normal right ventricle structure and function. Stress Test:     Angiography: 1/29/21 IMPRESSION:  1.  95% discrete mid LAD stenosis, status post successful PCI using  drug-eluting stent. 2.  70% tubular proximal OM1 stenosis. 3.  90% mid stenosis in a small nondominant RCA. 4.  Elevated LVEDP at 28 mmHg. 5.  Apical hypokinesis with an ejection fraction of 50%.   Cardiology Labs: BMP:    Lab Results   Component Value Date     01/30/2021    K 4.0 01/30/2021    K 4.2 01/28/2021     01/30/2021    CO2 23 01/30/2021    BUN 7 01/30/2021    CREATININE 0.8 01/30/2021     CMP:    Lab Results   Component Value Date     01/30/2021    K 4.0 01/30/2021    K 4.2 01/28/2021     01/30/2021    CO2 23 01/30/2021    BUN 7 01/30/2021    CREATININE 0.8 01/30/2021    PROT 6.9 01/29/2021     CBC:    Lab Results   Component Value Date    WBC 9.5 01/30/2021    RBC 4.48 01/30/2021    HGB 13.5 01/30/2021    HCT 41.5 01/30/2021    MCV 92.6 01/30/2021    RDW 15.0 01/30/2021     01/30/2021     PT/INR:  No results found for: PTINR  PT/INR Warfarin:  No components found for: PTPATWAR, PTINRWAR  PTT:    Lab Results   Component Value Date    APTT 30.6 01/30/2021     PTT Heparin:  No components found for: APTTHEP  Magnesium:    Lab Results   Component Value Date    MG 2.2 01/30/2021 TSH:    Lab Results   Component Value Date    TSH 2.100 01/29/2021     TROPONIN:  No components found for: TROP  BNP:  No results found for: BNP  FASTING LIPID PANEL:    Lab Results   Component Value Date    CHOL 177 01/29/2021    HDL 62 01/29/2021    TRIG 89 01/29/2021     No orders to display     I have personally reviewed the laboratory, cardiac diagnostic and radiographic testing as outlined above:      IMPRESSION:  1.  CAD: History of non-ST elevation MI s/p cardiac catheterization on 1/29/21 with the following findings:  # 95% discrete mid LAD stenosis, status post successful PCI using drug-eluting stent. # 70% tubular proximal OM1 stenosis. # 90% mid stenosis in a small nondominant RCA. # Elevated LVEDP at 28 mmHg. # Apical hypokinesis with an ejection fraction of 50%. will continue current treatment  2. Abnormal EKG: Incomplete right bundle branch block, old anterior wall MI age undetermined  3. Hyperlipidemia: On statin     RECOMMENDATIONS:   1. Continue current treatment  2. Risk of instent thrombosis due to premature discontinuation of either ASA or Plavix discussed with patient at length  3. Preventive cardiology: Low-salt, low-cholesterol diet, daily exercise, total cholesterol of less than 200, LDL of less than 70, adherence to diabetic diet and diabetic medications, smoking cessation were all advised. 4.  Declined cardiac rehab at this point of time since she is taking care of her sick  with advanced Parkinson's  5. Follow-up with Dr. Doreen Davey as scheduled  6. Follow-up with Dr. April Tolbert in 3 months, sooner if symptomatic for any reason    I have reviewed my findings and recommendations with patient    Electronically signed by Tere Valdez MD on 2/11/2021 at 1:18 PM    NOTE: This report was transcribed using voice recognition software.  Every effort was made to ensure accuracy; however, inadvertent computerized transcription errors may be present

## 2021-02-11 ENCOUNTER — OFFICE VISIT (OUTPATIENT)
Dept: CARDIOLOGY CLINIC | Age: 66
End: 2021-02-11
Payer: MEDICARE

## 2021-02-11 VITALS
SYSTOLIC BLOOD PRESSURE: 130 MMHG | WEIGHT: 206 LBS | DIASTOLIC BLOOD PRESSURE: 60 MMHG | HEART RATE: 84 BPM | BODY MASS INDEX: 38.89 KG/M2 | HEIGHT: 61 IN

## 2021-02-11 DIAGNOSIS — I25.10 CAD IN NATIVE ARTERY: Primary | ICD-10-CM

## 2021-02-11 PROCEDURE — G8417 CALC BMI ABV UP PARAM F/U: HCPCS | Performed by: INTERNAL MEDICINE

## 2021-02-11 PROCEDURE — 93000 ELECTROCARDIOGRAM COMPLETE: CPT | Performed by: INTERNAL MEDICINE

## 2021-02-11 PROCEDURE — 3017F COLORECTAL CA SCREEN DOC REV: CPT | Performed by: INTERNAL MEDICINE

## 2021-02-11 PROCEDURE — 1111F DSCHRG MED/CURRENT MED MERGE: CPT | Performed by: INTERNAL MEDICINE

## 2021-02-11 PROCEDURE — 99213 OFFICE O/P EST LOW 20 MIN: CPT | Performed by: INTERNAL MEDICINE

## 2021-02-11 PROCEDURE — 1036F TOBACCO NON-USER: CPT | Performed by: INTERNAL MEDICINE

## 2021-02-11 PROCEDURE — G8400 PT W/DXA NO RESULTS DOC: HCPCS | Performed by: INTERNAL MEDICINE

## 2021-02-11 PROCEDURE — G8427 DOCREV CUR MEDS BY ELIG CLIN: HCPCS | Performed by: INTERNAL MEDICINE

## 2021-02-11 PROCEDURE — 4040F PNEUMOC VAC/ADMIN/RCVD: CPT | Performed by: INTERNAL MEDICINE

## 2021-02-11 PROCEDURE — 1090F PRES/ABSN URINE INCON ASSESS: CPT | Performed by: INTERNAL MEDICINE

## 2021-02-11 PROCEDURE — 1123F ACP DISCUSS/DSCN MKR DOCD: CPT | Performed by: INTERNAL MEDICINE

## 2021-02-11 PROCEDURE — G8484 FLU IMMUNIZE NO ADMIN: HCPCS | Performed by: INTERNAL MEDICINE

## 2021-04-12 ENCOUNTER — TELEPHONE (OUTPATIENT)
Dept: PRIMARY CARE CLINIC | Age: 66
End: 2021-04-12

## 2021-05-19 ENCOUNTER — OFFICE VISIT (OUTPATIENT)
Dept: CARDIOLOGY CLINIC | Age: 66
End: 2021-05-19
Payer: MEDICARE

## 2021-05-19 VITALS
HEART RATE: 75 BPM | WEIGHT: 206 LBS | OXYGEN SATURATION: 97 % | DIASTOLIC BLOOD PRESSURE: 64 MMHG | BODY MASS INDEX: 38.89 KG/M2 | SYSTOLIC BLOOD PRESSURE: 120 MMHG | HEIGHT: 61 IN

## 2021-05-19 DIAGNOSIS — I25.10 CORONARY ARTERY DISEASE INVOLVING NATIVE CORONARY ARTERY OF NATIVE HEART WITHOUT ANGINA PECTORIS: Primary | ICD-10-CM

## 2021-05-19 PROCEDURE — G8417 CALC BMI ABV UP PARAM F/U: HCPCS | Performed by: INTERNAL MEDICINE

## 2021-05-19 PROCEDURE — G8400 PT W/DXA NO RESULTS DOC: HCPCS | Performed by: INTERNAL MEDICINE

## 2021-05-19 PROCEDURE — 1090F PRES/ABSN URINE INCON ASSESS: CPT | Performed by: INTERNAL MEDICINE

## 2021-05-19 PROCEDURE — 3017F COLORECTAL CA SCREEN DOC REV: CPT | Performed by: INTERNAL MEDICINE

## 2021-05-19 PROCEDURE — G8427 DOCREV CUR MEDS BY ELIG CLIN: HCPCS | Performed by: INTERNAL MEDICINE

## 2021-05-19 PROCEDURE — 1123F ACP DISCUSS/DSCN MKR DOCD: CPT | Performed by: INTERNAL MEDICINE

## 2021-05-19 PROCEDURE — 1036F TOBACCO NON-USER: CPT | Performed by: INTERNAL MEDICINE

## 2021-05-19 PROCEDURE — 4040F PNEUMOC VAC/ADMIN/RCVD: CPT | Performed by: INTERNAL MEDICINE

## 2021-05-19 PROCEDURE — 99213 OFFICE O/P EST LOW 20 MIN: CPT | Performed by: INTERNAL MEDICINE

## 2021-05-19 NOTE — PROGRESS NOTES
tablet 3    clopidogrel (PLAVIX) 75 MG tablet Take 1 tablet by mouth daily 30 tablet 3     No current facility-administered medications for this visit. Allergies as of 05/19/2021 - Fully Reviewed 05/19/2021   Allergen Reaction Noted    Latex Itching and Rash 02/03/2020    Aspirin Nausea And Vomiting and Other (See Comments) 02/22/2017    Chloraprep one step [chlorhexidine gluconate] Itching and Rash 02/17/2020       Social History     Socioeconomic History    Marital status:      Spouse name: Not on file    Number of children: Not on file    Years of education: Not on file    Highest education level: Not on file   Occupational History    Not on file   Tobacco Use    Smoking status: Never Smoker    Smokeless tobacco: Never Used   Vaping Use    Vaping Use: Never used   Substance and Sexual Activity    Alcohol use: Never    Drug use: Never    Sexual activity: Not on file   Other Topics Concern    Not on file   Social History Narrative    Not on file     Social Determinants of Health     Financial Resource Strain:     Difficulty of Paying Living Expenses:    Food Insecurity:     Worried About [address] in the Last Year:     [address] [place] in the Last Year:    Transportation Needs:     Lack of Transportation (Medical):      Lack of Transportation (Non-Medical):    Physical Activity:     Days of Exercise per Week:     Minutes of Exercise per Session:    Stress:     Feeling of Stress :    Social Connections:     Frequency of Communication with Friends and Family:     Frequency of Social Gatherings with Friends and Family:     Attends Religion Services:     Active Member of Clubs or Organizations:     Attends Club or Organization Meetings:     Marital Status:    Intimate Partner Violence:     Fear of Current or Ex-Partner:     Emotionally Abused:     Physically Abused:     Sexually Abused:        Family History   Problem Relation Age of Onset    Emphysema Father  Coronary Art Dis Sister 61        cabg    Heart Surgery Sister        REVIEW OF SYSTEMS:     CONSTITUTIONAL:  negative for  fevers, chills, sweats and fatigue  HEENT:  negative for  tinnitus, earaches, nasal congestion and epistaxis  RESPIRATORY:  negative for  dry cough, cough with sputum, wheezing and hemoptysis  GASTROINTESTINAL:  negative for nausea, vomiting, diarrhea, constipation, pruritus and jaundice  HEMATOLOGIC/LYMPHATIC:  negative for easy bruising, bleeding, lymphadenopathy and petechiae  ENDOCRINE:  negative for heat intolerance, cold intolerance, tremor, hair loss and diabetic symptoms including neither polyuria nor polydipsia nor blurred vision  MUSCULOSKELETAL:  negative for  myalgias, arthralgias, joint swelling, stiff joints and decreased range of motion  NEUROLOGICAL:  negative for memory problems, speech problems, visual disturbance, dysphagia, weakness and numbness      PHYSICAL EXAM:   CONSTITUTIONAL:  awake, alert, cooperative, no apparent distress, and appears stated age  HEAD:  normocepalic, without obvious abnormality, atraumatic, pink, moist mucous membranes. NECK:  Supple, symmetrical, trachea midline, no adenopathy, thyroid symmetric, not enlarged and no tenderness, skin normal  LUNGS:  No increased work of breathing, good air exchange, clear to auscultation bilaterally, no crackles or wheezing  CARDIOVASCULAR:  Normal apical impulse, regular rate and rhythm, normal S1 and S2, no S3 or S4, and no murmur noted and no JVD, no carotid bruit, no pedal edema, good carotid upstroke bilaterally. ABDOMEN:  Soft, nontender, no masses, no hepatomegaly or splenomegaly, BS+  CHEST: nontender to palpation, expands symmetrically  MUSCULOSKELETAL:  No clubbing no cyanosis. there is no redness, warmth, or swelling of the joints  full range of motion noted  NEUROLOGIC:  Alert, awake,oriented x3.   SKIN:  no bruising or bleeding, normal skin color, texture, turgor and no redness, warmth, or swelling Results   Component Value Date    TSH 2.100 01/29/2021     TROPONIN:  No components found for: TROP  BNP:  No results found for: BNP  FASTING LIPID PANEL:    Lab Results   Component Value Date    CHOL 177 01/29/2021    HDL 62 01/29/2021    TRIG 89 01/29/2021     No orders to display     I have personally reviewed the laboratory, cardiac diagnostic and radiographic testing as outlined above:      IMPRESSION:  1.  CAD: History of non-ST elevation MI s/p cardiac catheterization on 1/29/21 with the following findings:  # 95% discrete mid LAD stenosis, status post successful PCI using drug-eluting stent. # 70% tubular proximal OM1 stenosis. # 90% mid stenosis in a small nondominant RCA. # Elevated LVEDP at 28 mmHg. # Apical hypokinesis with an ejection fraction of 50%. will continue current treatment  2. Abnormal EKG: Incomplete right bundle branch block, old anterior wall MI age undetermined  3. Hyperlipidemia: On statin     RECOMMENDATIONS:   1. Continue current treatment  2. Risk of instent thrombosis due to premature discontinuation of either ASA or Plavix discussed with patient at length  3. Preventive cardiology: Low-salt, low-cholesterol diet, daily exercise, total cholesterol of less than 200, LDL of less than 70, adherence to diabetic diet and diabetic medications, smoking cessation were all advised. 4.  Follow-up with Dr. Jose Manuel Membreno as scheduled  5. Follow-up with Dr. Denys Webb in 3 months, sooner if symptomatic for any reason    I have reviewed my findings and recommendations with patient    Electronically signed by Subha Griffin MD on 5/19/2021 at 12:04 PM    NOTE: This report was transcribed using voice recognition software.  Every effort was made to ensure accuracy; however, inadvertent computerized transcription errors may be present

## 2021-06-02 RX ORDER — CLOPIDOGREL BISULFATE 75 MG/1
75 TABLET ORAL DAILY
Qty: 90 TABLET | Refills: 3 | Status: SHIPPED
Start: 2021-06-02 | End: 2022-05-27

## 2021-06-02 RX ORDER — ATORVASTATIN CALCIUM 40 MG/1
40 TABLET, FILM COATED ORAL NIGHTLY
Qty: 90 TABLET | Refills: 3 | Status: SHIPPED
Start: 2021-06-02 | End: 2022-05-27

## 2021-06-02 RX ORDER — METOPROLOL SUCCINATE 25 MG/1
25 TABLET, EXTENDED RELEASE ORAL DAILY
Qty: 90 TABLET | Refills: 3 | Status: SHIPPED
Start: 2021-06-02 | End: 2022-05-27

## 2021-07-27 ENCOUNTER — TELEPHONE (OUTPATIENT)
Dept: CARDIAC REHAB | Age: 66
End: 2021-07-27

## 2021-07-27 NOTE — TELEPHONE ENCOUNTER
Contacted patient several times about joining [facility] with no response. Left message to let patient know she is able to join at any time in the future if interested.  To be notified.

## 2021-11-11 ENCOUNTER — HOSPITAL ENCOUNTER (EMERGENCY)
Age: 66
Discharge: HOME | End: 2021-11-11
Attending: STUDENT IN AN ORGANIZED HEALTH CARE EDUCATION/TRAINING PROGRAM
Payer: MEDICARE

## 2021-11-11 VITALS
SYSTOLIC BLOOD PRESSURE: 145 MMHG | OXYGEN SATURATION: 98 % | BODY MASS INDEX: 40.25 KG/M2 | TEMPERATURE: 97.6 F | HEART RATE: 61 BPM | RESPIRATION RATE: 18 BRPM | WEIGHT: 213 LBS | DIASTOLIC BLOOD PRESSURE: 77 MMHG

## 2021-11-11 DIAGNOSIS — R04.0 EPISTAXIS: Primary | ICD-10-CM

## 2021-11-11 PROCEDURE — 99284 EMERGENCY DEPT VISIT MOD MDM: CPT

## 2021-11-11 RX ORDER — OXYMETAZOLINE HYDROCHLORIDE 0.05 G/100ML
SPRAY NASAL
Status: DISCONTINUED
Start: 2021-11-11 | End: 2021-11-11 | Stop reason: HOSPADM

## 2021-11-11 RX ORDER — OXYMETAZOLINE HYDROCHLORIDE 0.05 G/100ML
2 SPRAY NASAL ONCE
Status: DISCONTINUED | OUTPATIENT
Start: 2021-11-11 | End: 2021-11-11 | Stop reason: HOSPADM

## 2021-11-12 ENCOUNTER — TELEPHONE (OUTPATIENT)
Dept: CARDIOLOGY CLINIC | Age: 66
End: 2021-11-12

## 2021-11-12 NOTE — TELEPHONE ENCOUNTER
Patient calling to ask if she should continue to take her Plavix. She states that she has been having frequent nose bleeds since Tuesday and was seen at the hospital for this yesterday. Please advise.

## 2021-11-13 ENCOUNTER — HOSPITAL ENCOUNTER (EMERGENCY)
Age: 66
Discharge: HOME | End: 2021-11-13
Attending: EMERGENCY MEDICINE
Payer: MEDICARE

## 2021-11-13 VITALS
SYSTOLIC BLOOD PRESSURE: 170 MMHG | OXYGEN SATURATION: 93 % | RESPIRATION RATE: 20 BRPM | TEMPERATURE: 96.2 F | HEART RATE: 90 BPM | DIASTOLIC BLOOD PRESSURE: 83 MMHG

## 2021-11-13 DIAGNOSIS — R04.0 EPISTAXIS: Primary | ICD-10-CM

## 2021-11-13 LAB
HCT VFR BLD CALC: 41.6 % (ref 34–48)
HEMOGLOBIN: 13.8 G/DL (ref 11.5–15.5)
INR BLD: 1
MCH RBC QN AUTO: 30.7 PG (ref 26–35)
MCHC RBC AUTO-ENTMCNC: 33.2 % (ref 32–34.5)
MCV RBC AUTO: 92.7 FL (ref 80–99.9)
PDW BLD-RTO: 14.3 FL (ref 11.5–15)
PLATELET # BLD: 369 E9/L (ref 130–450)
PMV BLD AUTO: 9.8 FL (ref 7–12)
PROTHROMBIN TIME: 11 SEC (ref 9.3–12.4)
RBC # BLD: 4.49 E12/L (ref 3.5–5.5)
WBC # BLD: 9.9 E9/L (ref 4.5–11.5)

## 2021-11-13 PROCEDURE — 99283 EMERGENCY DEPT VISIT LOW MDM: CPT

## 2021-11-13 PROCEDURE — 85610 PROTHROMBIN TIME: CPT

## 2021-11-13 PROCEDURE — 85027 COMPLETE CBC AUTOMATED: CPT

## 2021-11-13 PROCEDURE — 6370000000 HC RX 637 (ALT 250 FOR IP): Performed by: STUDENT IN AN ORGANIZED HEALTH CARE EDUCATION/TRAINING PROGRAM

## 2021-11-13 RX ORDER — OXYMETAZOLINE HYDROCHLORIDE 0.05 G/100ML
2 SPRAY NASAL ONCE
Status: COMPLETED | OUTPATIENT
Start: 2021-11-13 | End: 2021-11-13

## 2021-11-13 RX ADMIN — OXYMETAZOLINE HYDROCHLORIDE 2 SPRAY: 0 SPRAY NASAL at 06:03

## 2021-11-13 RX ADMIN — SALINE NASAL SPRAY 1 SPRAY: 1.5 SOLUTION NASAL at 07:16

## 2021-11-13 ASSESSMENT — ENCOUNTER SYMPTOMS
VOMITING: 0
SHORTNESS OF BREATH: 0
NAUSEA: 0
BACK PAIN: 0
SORE THROAT: 0
ABDOMINAL DISTENTION: 0
ABDOMINAL PAIN: 0
COUGH: 0
CHEST TIGHTNESS: 0
DIARRHEA: 0

## 2021-11-13 NOTE — ED PROVIDER NOTES
HPI     Patient is a 74 y.o. female presents with a chief complaint of nose bleed  This has been occurring for 2 hours. Patient states that it gets better with nothing. Patient states that it gets worse with nothing. Patient states that it is moderate in severity. Patient states it was gradual in onset. Patient stated that she developed a nosebleed at 4:00 this morning. This is the third nosebleed in the past week. Patient is on Plavix. Patient stated that previously she came into the ER and they were able to clear her nosebleed. Patient took Bernadine Damon yesterday that resolved the problem. Patient has no signs of trauma. Patient denies any fevers, chills, nausea, vomiting, chest pain, shortness of breath, abdominal pain, change in urinary or bowel habits. Review of Systems   Constitutional: Negative for activity change, appetite change, chills, fatigue and fever. HENT: Positive for nosebleeds. Negative for congestion, drooling, ear discharge, ear pain and sore throat. Respiratory: Negative for cough, chest tightness and shortness of breath. Cardiovascular: Negative for chest pain and palpitations. Gastrointestinal: Negative for abdominal distention, abdominal pain, diarrhea, nausea and vomiting. Genitourinary: Negative for decreased urine volume, difficulty urinating, enuresis, flank pain, frequency and hematuria. Musculoskeletal: Negative for arthralgias, back pain and neck stiffness. Skin: Negative for rash and wound. Neurological: Negative for dizziness, facial asymmetry, light-headedness and headaches. Psychiatric/Behavioral: Negative for agitation, confusion and decreased concentration. Physical Exam  Vitals and nursing note reviewed. Constitutional:       Appearance: She is well-developed. HENT:      Head: Normocephalic and atraumatic. Mouth/Throat:      Pharynx: No oropharyngeal exudate or posterior oropharyngeal erythema.       Comments: Patient has a towel over her nose with a mild nosebleed out of the left nostril. Patient is in no respiratory distress. Eyes:      Conjunctiva/sclera: Conjunctivae normal.   Cardiovascular:      Rate and Rhythm: Normal rate and regular rhythm. Heart sounds: Normal heart sounds. No murmur heard. Pulmonary:      Effort: Pulmonary effort is normal. No respiratory distress. Breath sounds: Normal breath sounds. No wheezing or rales. Abdominal:      General: Bowel sounds are normal.      Palpations: Abdomen is soft. Tenderness: There is no abdominal tenderness. There is no guarding or rebound. Musculoskeletal:      Cervical back: Normal range of motion and neck supple. Skin:     General: Skin is warm and dry. Neurological:      Mental Status: She is alert and oriented to person, place, and time. Cranial Nerves: No cranial nerve deficit. Coordination: Coordination normal.          Procedures     MDM     ED Course as of 11/13/21 0717   Sat Nov 13, 2021   6740 Patient was evaluated and has no active bleeding at this time but may have some small amount of postnasal drip and may be a remnant from prior. [JM]   6278 Patient was educated to use Afrin at home. Patient currently uses a humidifier. [JM]   3392 Patient was reevaluated and her nose is still bleeding. Patient was educated to use Afrin and nasal spray. [JM]      ED Course User Index  [JM] Rubens Pacheco MD      Patient is a 74 y.o. female presenting with nosebleed patient has been seen for this multiple times for this. Patient is hemodynamically stable. Patient appears well. Patient was instructed to blow out her nose and had Afrin spray to both nostrils. Patient was then asked to hold pressure for 15 minutes. Patient had labs drawn. Patient's labs are benign. Patient had nasal bleeding under control. Patient was educated to use Afrin and Ocean nasal spray.   Patient will be discharged home and stated that she is going to follow-up with her ENT in the next 2 to 3 weeks. Patient was given return precautions. Patient will follow up with their primary care provider. Patient is agreeable to this plan. Patient has remained stable throughout their stay in the ED. Patient was seen and evaluated by myself and my attending Marshall Madden MD. Assessment and Plan discussed with attending provider, please see attestation for final plan of care. This note was done using dictation software and there may be some grammatical errors associated with this. Estelita Ortiz MD       ED Course as of 11/13/21 0718   Sat Nov 13, 2021   9807 Patient was evaluated and has no active bleeding at this time but may have some small amount of postnasal drip and may be a remnant from prior. [JM]   3165 Patient was educated to use Afrin at home. Patient currently uses a humidifier. [JM]   1087 Patient was reevaluated and her nose is still bleeding. Patient was educated to use Afrin and nasal spray. [JM]      ED Course User Index  [JM] Estelita Ortiz MD       --------------------------------------------- PAST HISTORY ---------------------------------------------  Past Medical History:  has a past medical history of CAD (coronary artery disease), Cyst of left kidney, Diverticulitis, Gallbladder problem, History of blood transfusion, Hyperlipidemia, Phlebitis, and PONV (postoperative nausea and vomiting). Past Surgical History:  has a past surgical history that includes Hysterectomy, vaginal (2012); Toe Surgery (2000); Splenectomy, total (1979); Tonsillectomy; Ectopic pregnancy surgery (1979); Colonoscopy; Cholecystectomy, laparoscopic (N/A, 2/4/2020); and Coronary angioplasty with stent (01/29/2021). Social History:  reports that she has never smoked. She has never used smokeless tobacco. She reports that she does not drink alcohol and does not use drugs.     Family History: family history includes Coronary Art Dis (age of onset: 61) in her sister; Emphysema in her father; Heart Surgery in her sister. The patients home medications have been reviewed. Allergies: Latex, Aspirin, and Chloraprep one step [chlorhexidine gluconate]    -------------------------------------------------- RESULTS -------------------------------------------------  Labs:  Results for orders placed or performed during the hospital encounter of 11/13/21   CBC   Result Value Ref Range    WBC 9.9 4.5 - 11.5 E9/L    RBC 4.49 3.50 - 5.50 E12/L    Hemoglobin 13.8 11.5 - 15.5 g/dL    Hematocrit 41.6 34.0 - 48.0 %    MCV 92.7 80.0 - 99.9 fL    MCH 30.7 26.0 - 35.0 pg    MCHC 33.2 32.0 - 34.5 %    RDW 14.3 11.5 - 15.0 fL    Platelets 140 814 - 361 E9/L    MPV 9.8 7.0 - 12.0 fL       Radiology:  No orders to display       ------------------------- NURSING NOTES AND VITALS REVIEWED ---------------------------  Date / Time Roomed:  11/13/2021  5:41 AM  ED Bed Assignment:  [facility] [place]    The nursing notes within the ED encounter and vital signs as below have been reviewed. BP (!) 170/83   Pulse 90   Temp 96.2 °F (35.7 °C) (Temporal)   Resp 20   SpO2 93%   Oxygen Saturation Interpretation: Normal      ------------------------------------------ PROGRESS NOTES ------------------------------------------  7:18 AM EST  I have spoken with the patient and family if present and discussed todays results, in addition to providing specific details for the plan of care and counseling regarding the diagnosis and prognosis. Their questions are answered at this time and they are agreeable with the plan. I discussed at length with them reasons for immediate return here for re evaluation. They will followup with their primary care provider and ENT by calling their office as soon as possible.      --------------------------------- ADDITIONAL PROVIDER NOTES ---------------------------------  At this time the patient is without objective evidence of an acute process requiring hospitalization or inpatient management.   They have remained hemodynamically stable throughout their entire ED visit and are stable for discharge with outpatient follow-up. The plan has been discussed in detail and they are aware of the specific conditions for emergent return, as well as the importance of follow-up. New Prescriptions    No medications on file       Diagnosis:  1. Epistaxis        Disposition:  Patient's disposition: Discharge to home  Patient's condition is stable.          Ofe Greenfield MD  Resident  11/13/21 6743

## 2021-11-15 ENCOUNTER — TELEPHONE (OUTPATIENT)
Dept: ADMINISTRATIVE | Age: 66
End: 2021-11-15

## 2021-11-15 NOTE — TELEPHONE ENCOUNTER
Patient has an appointment on 11/23 at 9:45am as a New patient with Viviana [facility]. She was seen at [facility] on 11/11 and 11/13 for Epistaxis. She stated she had another nose bleed last night and is asking to be seen right away, She stated she cannot wait intil next week. Please contact patient.

## 2021-11-16 ENCOUNTER — OFFICE VISIT (OUTPATIENT)
Dept: ENT CLINIC | Age: 66
End: 2021-11-16
Payer: MEDICARE

## 2021-11-16 ENCOUNTER — TELEPHONE (OUTPATIENT)
Dept: CARDIOLOGY CLINIC | Age: 66
End: 2021-11-16

## 2021-11-16 VITALS
DIASTOLIC BLOOD PRESSURE: 70 MMHG | HEIGHT: 61 IN | WEIGHT: 231 LBS | BODY MASS INDEX: 43.61 KG/M2 | HEART RATE: 81 BPM | SYSTOLIC BLOOD PRESSURE: 162 MMHG

## 2021-11-16 DIAGNOSIS — R04.0 EPISTAXIS: Primary | ICD-10-CM

## 2021-11-16 PROCEDURE — G8484 FLU IMMUNIZE NO ADMIN: HCPCS | Performed by: OTOLARYNGOLOGY

## 2021-11-16 PROCEDURE — G8427 DOCREV CUR MEDS BY ELIG CLIN: HCPCS | Performed by: OTOLARYNGOLOGY

## 2021-11-16 PROCEDURE — 3017F COLORECTAL CA SCREEN DOC REV: CPT | Performed by: OTOLARYNGOLOGY

## 2021-11-16 PROCEDURE — 1036F TOBACCO NON-USER: CPT | Performed by: OTOLARYNGOLOGY

## 2021-11-16 PROCEDURE — G8400 PT W/DXA NO RESULTS DOC: HCPCS | Performed by: OTOLARYNGOLOGY

## 2021-11-16 PROCEDURE — 4040F PNEUMOC VAC/ADMIN/RCVD: CPT | Performed by: OTOLARYNGOLOGY

## 2021-11-16 PROCEDURE — G8417 CALC BMI ABV UP PARAM F/U: HCPCS | Performed by: OTOLARYNGOLOGY

## 2021-11-16 PROCEDURE — 1123F ACP DISCUSS/DSCN MKR DOCD: CPT | Performed by: OTOLARYNGOLOGY

## 2021-11-16 PROCEDURE — 1090F PRES/ABSN URINE INCON ASSESS: CPT | Performed by: OTOLARYNGOLOGY

## 2021-11-16 PROCEDURE — 99203 OFFICE O/P NEW LOW 30 MIN: CPT | Performed by: OTOLARYNGOLOGY

## 2021-11-16 NOTE — TELEPHONE ENCOUNTER
Patient has been getting a lot of nose bleeds recently and had to have it cauterized. She states she heard that sometimes you need to have your blood thinner changed if you develop problems    So, she is asking if you can switch her Plavix?

## 2021-11-16 NOTE — PROGRESS NOTES
Subjective:      Patient ID:  Campton Kunal is a 74 y.o. female. HPI:  Recurrent Epistaxis  The patientis a 74 y.o. female who presentes with epistaxis. The patientreports nosebleeds for a few weeks. They usually occurbilaterally. Pt was was in the ER   was not cauterized on the bilateralside   was not packed on the bilateralside. This is not the patients first event of epistaxis. Prior therapy has included topical decongestant sprays. Chronic O2?no    There is not historyof easy bruising or bleeding. Pt is  on anticoagulationtherapy - Plavix          Otherepistaxis risk factors: dry air  Patient'smedications, allergies, past medical, surgical, social and family histories werereviewed and updated as appropriate. Review of Systems   HENT: Positive for nosebleeds. All other systems reviewed and are negative. Objective:     Vitals:    11/16/21 0910   BP: (!) 162/70   Pulse: 81       Physical Exam  Constitutional:       Appearance: Normal appearance. HENT:      Head: Normocephalic and atraumatic. Right Ear: External ear normal.      Left Ear: External ear normal.      Nose:        Mouth/Throat:      Mouth: Mucous membranes are moist.   Eyes:      Pupils: Pupils are equal, round, and reactive to light. Cardiovascular:      Rate and Rhythm: Normal rate. Skin:     General: Skin is warm and dry. Neurological:      General: No focal deficit present. Mental Status: She is alert and oriented to person, place, and time. Procedure - NasalCautery  The rightside of the patient was found to have prominent septal vessels in the Anterior portion of the septum. The nose was anesthetized with a mixture of lidocaine2% and afrin nasal spray sprayed 3 times in the right nostril(s). The irritated area was then cauterized with a silver nitrate stickuntil a white frost covered the affected area and no bleeding was seen.   The nosewas packed with Fibrillar and diannefo      Assessment:          Diagnosis Orders   1. Epistaxis               Plan:      R septum cauterized in office today, packed with dissolvable packing  · Open Mouth and cover when sneezing, coughing  · No nose blowing for 2 weeks  · Nasal saline spray in each nostril 4-5 times a day    Follow up in 1 week(s), may need cauterized on L side     Electronically signed by Ben Dash DO on 11/16/2021 at 10:02 AM                       Alessandro Carias  1955      I have discussed the case, including pertinent history and exam findings with the resident. I have seen and examined the patient and the key elements of the encounter have been performed by me. I agree with the assessment, plan and orders as documented by the resident. Patient here for follow up of medical problems. Remainder of medical problems as per resident note.       [address] [facility], DO  12/8/21

## 2021-11-16 NOTE — PATIENT INSTRUCTIONS
Three days after the packing has been removed, Use over the counter Ocean Nasal Spray 3-4 times a day or any other over the counter saline nasal spray in both nostrils. Do not blow your nose for the next 7-10 days. If you have to cough or sneeze open your mouth. The packing that was put into your nose today may or may not fall out on its own if packing does fall out it is not a problem. Do not do any heavy lifting,bending over or strenuous work, for example (cutting the grass,vacuuming)    If your nosebleed starts up pinch the soft part of your nose for 5 minutes, if it does not stop, after 5 minutes pinch again for another 5 more minutes. If nosebleed continues after the 10 minutes, you can call our office during regular business hours [phone] and after business hours go to the nearest Emergency Department.

## 2021-11-21 ASSESSMENT — ENCOUNTER SYMPTOMS
ABDOMINAL DISTENTION: 0
EYE REDNESS: 0
COUGH: 0
SINUS PRESSURE: 0
SHORTNESS OF BREATH: 0
DIARRHEA: 0
WHEEZING: 0
BACK PAIN: 0
EYE DISCHARGE: 0
EYE PAIN: 0
VOMITING: 0
SORE THROAT: 0
NAUSEA: 0

## 2021-11-22 NOTE — ED PROVIDER NOTES
Jose Aquino is a 74 y.o. female with a PMHx significant for HLD and CAD who presents for evaluation of epistaxis, beginning prior to arrival.  The complaint has been persistent, moderate in severity, and worsened by nothing. The patient states that earlier while at home she started to have a nosebleed. She has tried to apply pressure to it without much improvement. Notes that she is on plavix due to her history of CAD. Patient denies any dizziness, lightheadedness, chest pain, shortness of breath. The history is provided by the patient and medical records. Review of Systems   Constitutional: Negative for chills and fever. HENT: Positive for nosebleeds. Negative for ear pain, sinus pressure and sore throat. Eyes: Negative for pain, discharge and redness. Respiratory: Negative for cough, shortness of breath and wheezing. Cardiovascular: Negative for chest pain. Gastrointestinal: Negative for abdominal distention, diarrhea, nausea and vomiting. Genitourinary: Negative for dysuria and frequency. Musculoskeletal: Negative for arthralgias and back pain. Skin: Negative for rash and wound. Neurological: Negative for weakness and headaches. Hematological: Negative for adenopathy. All other systems reviewed and are negative. Physical Exam  Vitals and nursing note reviewed. Constitutional:       General: She is not in acute distress. Appearance: Normal appearance. She is well-developed. She is not ill-appearing. HENT:      Head: Normocephalic and atraumatic. Right Ear: External ear normal.      Left Ear: External ear normal.      Nose:      Comments: Blood noted from the left nares    Eyes:      Extraocular Movements: Extraocular movements intact. Conjunctiva/sclera: Conjunctivae normal.   Cardiovascular:      Rate and Rhythm: Normal rate and regular rhythm. Heart sounds: Normal heart sounds. No murmur heard.       Pulmonary:      Effort: Pulmonary effort is normal. No respiratory distress. Breath sounds: Normal breath sounds. No stridor. Abdominal:      General: Bowel sounds are normal. There is no distension. Palpations: Abdomen is soft. There is no mass. Musculoskeletal:      Cervical back: Normal range of motion and neck supple. Skin:     General: Skin is warm and dry. Coloration: Skin is not jaundiced or pale. Neurological:      General: No focal deficit present. Mental Status: She is alert and oriented to person, place, and time. Procedures     MDM     ED Course as of 11/21/21 2236   Thu Nov 11, 2021   1306 Patient reevaluated, coughing up clots at this time. She was given intranasal Afrin mixed with some lidocaine without epinephrine. She then had her left nares packed with cotton ball. There is no evidence of any active bleeding at this time. There is no blood going down the posterior oropharynx. Will observe. [BB]   1429 Patient divided, lying bed no distress. Packing was removed. There is been no bleeding, there is no bleeding posterior oropharynx. Will observe another 20 minutes then discharged home. [BB]      ED Course User Index  [BB] DO Erin Douglas Kunal presents to the ED for evaluation of nosebleed. Workup in the ED revealed patient who is not tachycardic or hypotensive. Not having any symptoms of dizziness, lightheadedness, chest pain, or shortness of breath. Attempted to stop the bleeding initially with pressure followed by gauze soaked in lidocaine and afrin. Patient was monitored in the department for evidence of rebleeding, which there was none; no evidence of septal hematoma on evaluation. Patient continues to be non-toxic on re-evaluation. Findings were discussed with the patient and reasons to immediately return to the ED were articulated to them. They will follow-up with their PCP.    Discussion with patient in regards to continuing her plavix as that will help keep her stent open.    --------------------------------------------- PAST HISTORY ---------------------------------------------  Past Medical History:  has a past medical history of CAD (coronary artery disease), Cyst of left kidney, Diverticulitis, Gallbladder problem, History of blood transfusion, Hyperlipidemia, Phlebitis, and PONV (postoperative nausea and vomiting). Past Surgical History:  has a past surgical history that includes Hysterectomy, vaginal (2012); Toe Surgery (2000); Splenectomy, total (1979); Tonsillectomy; Ectopic pregnancy surgery (1979); Colonoscopy; Cholecystectomy, laparoscopic (N/A, 2/4/2020); and Coronary angioplasty with stent (01/29/2021). Social History:  reports that she has never smoked. She has never used smokeless tobacco. She reports that she does not drink alcohol and does not use drugs. Family History: family history includes Coronary Art Dis (age of onset: 61) in her sister; Emphysema in her father; Heart Surgery in her sister. The patients home medications have been reviewed. Allergies: Latex, Aspirin, and Chloraprep one step [chlorhexidine gluconate]    -------------------------------------------------- RESULTS -------------------------------------------------  Labs:  No results found for this visit on 11/11/21. Radiology:  No orders to display       ------------------------- NURSING NOTES AND VITALS REVIEWED ---------------------------  Date / Time Roomed:  11/11/2021 12:13 PM  ED Bed Assignment:  [id number]    The nursing notes within the ED encounter and vital signs as below have been reviewed.    BP (!) 145/77   Pulse 61   Temp 97.6 °F (36.4 °C) (Temporal)   Resp 18   Wt 213 lb (96.6 kg)   SpO2 98%   BMI 40.25 kg/m²   Oxygen Saturation Interpretation: Normal      ------------------------------------------ PROGRESS NOTES ------------------------------------------  10:39 PM EST  I have spoken with the patient and discussed todays results, in addition to providing specific details for the plan of care and counseling regarding the diagnosis and prognosis. Their questions are answered at this time and they are agreeable with the plan. I discussed at length with them reasons for immediate return here for re evaluation. They will followup with their primary care physician by calling their office tomorrow. --------------------------------- ADDITIONAL PROVIDER NOTES ---------------------------------  At this time the patient is without objective evidence of an acute process requiring hospitalization or inpatient management. They have remained hemodynamically stable throughout their entire ED visit and are stable for discharge with outpatient follow-up. The plan has been discussed in detail and they are aware of the specific conditions for emergent return, as well as the importance of follow-up. Discharge Medication List as of 11/11/2021  3:37 PM          Diagnosis:  1. Epistaxis        Disposition:  Patient's disposition: Discharge to home  Patient's condition is stable.        Kady Gabriel DO  11/21/21 2244

## 2021-11-23 ENCOUNTER — OFFICE VISIT (OUTPATIENT)
Dept: ENT CLINIC | Age: 66
End: 2021-11-23
Payer: MEDICARE

## 2021-11-23 VITALS
DIASTOLIC BLOOD PRESSURE: 73 MMHG | BODY MASS INDEX: 40.35 KG/M2 | HEIGHT: 61 IN | SYSTOLIC BLOOD PRESSURE: 144 MMHG | WEIGHT: 213.7 LBS | HEART RATE: 86 BPM

## 2021-11-23 DIAGNOSIS — R04.0 EPISTAXIS: Primary | ICD-10-CM

## 2021-11-23 PROCEDURE — G8484 FLU IMMUNIZE NO ADMIN: HCPCS | Performed by: OTOLARYNGOLOGY

## 2021-11-23 PROCEDURE — 1090F PRES/ABSN URINE INCON ASSESS: CPT | Performed by: OTOLARYNGOLOGY

## 2021-11-23 PROCEDURE — 1036F TOBACCO NON-USER: CPT | Performed by: OTOLARYNGOLOGY

## 2021-11-23 PROCEDURE — G8400 PT W/DXA NO RESULTS DOC: HCPCS | Performed by: OTOLARYNGOLOGY

## 2021-11-23 PROCEDURE — 99213 OFFICE O/P EST LOW 20 MIN: CPT | Performed by: OTOLARYNGOLOGY

## 2021-11-23 PROCEDURE — G8427 DOCREV CUR MEDS BY ELIG CLIN: HCPCS | Performed by: OTOLARYNGOLOGY

## 2021-11-23 PROCEDURE — G8417 CALC BMI ABV UP PARAM F/U: HCPCS | Performed by: OTOLARYNGOLOGY

## 2021-11-23 PROCEDURE — 1123F ACP DISCUSS/DSCN MKR DOCD: CPT | Performed by: OTOLARYNGOLOGY

## 2021-11-23 PROCEDURE — 4040F PNEUMOC VAC/ADMIN/RCVD: CPT | Performed by: OTOLARYNGOLOGY

## 2021-11-23 PROCEDURE — 3017F COLORECTAL CA SCREEN DOC REV: CPT | Performed by: OTOLARYNGOLOGY

## 2021-11-23 NOTE — PROGRESS NOTES
[id number] [facility] Otolaryngology  Dr. Liam Judge. Lia Polk. Ms.Ed        Patient Name:  Dc Alonzo  :  1955     CHIEF C/O:    Chief Complaint   Patient presents with    Epistaxis     no further nose bleeds after last visit- off of plavix       HISTORY OBTAINED FROM:  patient    HISTORY OF PRESENT ILLNESS:       Casi Miller is a 74y.o. year old female, here today for follow up of right sided epistaxis, no bleeding after cautery in the office with a as well as epistaxis recall. Patient denies any current complaints of epistaxis headaches vision changes sore throat nasal congestion fever chills.       Past Medical History:   Diagnosis Date    CAD (coronary artery disease)     Cyst of left kidney     x 2    Diverticulitis     Gallbladder problem 2020    History of blood transfusion 40 years ago    Hyperlipidemia     Phlebitis 40 years ago    while hospitalized    PONV (postoperative nausea and vomiting)      Past Surgical History:   Procedure Laterality Date    CHOLECYSTECTOMY, LAPAROSCOPIC N/A 2020    LAPAROSCOPIC ROBOTIC XI ASSISTED CHOLECYSTECTOMY performed by Karlie Garcia MD at [facility]  2021    Dr Carlito Jacksno, [facility] resolute yue 2.5x26 in the Mid LAD    [address], VAGINAL      SPLENECTOMY, TOTAL      TOE SURGERY      TONSILLECTOMY         Current Outpatient Medications:     atorvastatin (LIPITOR) 40 MG tablet, Take 1 tablet by mouth nightly, Disp: 90 tablet, Rfl: 3    metoprolol succinate (TOPROL XL) 25 MG extended release tablet, Take 1 tablet by mouth daily, Disp: 90 tablet, Rfl: 3    clopidogrel (PLAVIX) 75 MG tablet, Take 1 tablet by mouth daily (Patient not taking: Reported on 2021), Disp: 90 tablet, Rfl: 3    aspirin 81 MG EC tablet, Take 1 tablet by mouth daily (Patient not taking: Reported on 2021), Disp: 30 tablet, Rfl: 3  Latex, Aspirin, and Chloraprep one step [chlorhexidine gluconate]  Social History     Tobacco Use    Smoking status: Never Smoker    Smokeless tobacco: Never Used   Vaping Use    Vaping Use: Never used   Substance Use Topics    Alcohol use: Never    Drug use: Never     Family History   Problem Relation Age of Onset    Emphysema Father     Coronary Art Dis Sister 61        cabg    Heart Surgery Sister        Review of Systems   Constitutional: Negative for chills and fever. HENT: Positive for congestion. Negative for ear discharge, hearing loss, rhinorrhea, sinus pressure and trouble swallowing. Respiratory: Negative for cough and shortness of breath. Cardiovascular: Negative for chest pain and palpitations. Gastrointestinal: Negative for vomiting. Skin: Negative for rash. Allergic/Immunologic: Negative for environmental allergies. Neurological: Negative for dizziness and headaches. Hematological: Does not bruise/bleed easily. All other systems reviewed and are negative. BP (!) 144/73 (Site: Left Upper Arm, Position: Sitting, Cuff Size: Medium Adult)   Pulse 86   Ht 5' 1\" (1.549 m)   Wt 213 lb 11.2 oz (96.9 kg)   LMP  (LMP Unknown)   BMI 40.38 kg/m²   Physical Exam  Vitals and nursing note reviewed. Constitutional:       Appearance: She is well-developed. HENT:      Head: Normocephalic and atraumatic. Right Ear: Tympanic membrane and ear canal normal.      Left Ear: Ear canal normal.   Eyes:      Pupils: Pupils are equal, round, and reactive to light. Neck:      Thyroid: No thyromegaly. Trachea: No tracheal deviation. Cardiovascular:      Rate and Rhythm: Normal rate. Pulmonary:      Effort: Pulmonary effort is normal. No respiratory distress. Musculoskeletal:         General: Normal range of motion. Cervical back: Normal range of motion. Lymphadenopathy:      Cervical: No cervical adenopathy. Skin:     General: Skin is warm. Findings: No erythema.    Neurological: Mental Status: She is alert. Cranial Nerves: No cranial nerve deficit. IMPRESSION/PLAN:  Current epistaxis that is post significant provement with nasal cautery, recommend patient for continue nasal saline rinses, may restart anticoagulation, and follow-up with ENT as needed. Dr. Baron Kenia Thompson.  Otolaryngology Facial Plastic Surgery  :  [facility] Otolaryngology/Facial Plastic Surgery Residency  Associate Clinical Professor:  Wyona Severe, [employer]  [facility]

## 2021-12-16 ASSESSMENT — ENCOUNTER SYMPTOMS
VOMITING: 0
SINUS PRESSURE: 0
RHINORRHEA: 0
SHORTNESS OF BREATH: 0
COUGH: 0
TROUBLE SWALLOWING: 0

## 2022-01-18 NOTE — PROGRESS NOTES
[facility] Cardiology Progress Note  Dr. Ralph Moraes      Referring Physician: Alfredo Downey MD  CHIEF COMPLAINT:   Chief Complaint   Patient presents with    Coronary Artery Disease    6 Month Follow-Up       HISTORY OF PRESENT ILLNESS:   74year old female with history of CAD s/p PCI to LAD, hyperlipidemia is here for a follow up visit. Patient denies any chest pain, no shortness of breath, no lightheadedness, no dizziness, no palpitations, no pedal edema, no PND, no orthopnea, no syncope, no presyncopal episodes.     Denies noncompliance with dual antiplatelet therapy  Taking care of her sick  with advanced Parkinson's    Past Medical History:   Diagnosis Date    CAD (coronary artery disease)     Cyst of left kidney     x 2    Diverticulitis     Gallbladder problem 01/2020    History of blood transfusion 40 years ago    Hyperlipidemia     Phlebitis 40 years ago    while hospitalized    PONV (postoperative nausea and vomiting)          Past Surgical History:   Procedure Laterality Date    CHOLECYSTECTOMY, LAPAROSCOPIC N/A 2/4/2020    LAPAROSCOPIC ROBOTIC XI ASSISTED CHOLECYSTECTOMY performed by Sabrina Stockton MD at [address]  01/29/2021    Rebeca To resolute yue 2.5x26 in the Mid LAD    [address], VAGINAL  2012    SPLENECTOMY, TOTAL  1979    TOE SURGERY  2000    TONSILLECTOMY           Current Outpatient Medications   Medication Sig Dispense Refill    atorvastatin (LIPITOR) 40 MG tablet Take 1 tablet by mouth nightly 90 tablet 3    clopidogrel (PLAVIX) 75 MG tablet Take 1 tablet by mouth daily 90 tablet 3    metoprolol succinate (TOPROL XL) 25 MG extended release tablet Take 1 tablet by mouth daily 90 tablet 3    aspirin 81 MG EC tablet Take 1 tablet by mouth daily (Patient not taking: Reported on 11/16/2021) 30 tablet 3     No current facility-administered medications for this visit. Allergies as of 01/19/2022 - Fully Reviewed 01/19/2022   Allergen Reaction Noted    Latex Itching and Rash 02/03/2020    Aspirin Nausea And Vomiting and Other (See Comments) 02/22/2017    Chloraprep one step [chlorhexidine gluconate] Itching and Rash 02/17/2020       Social History     Socioeconomic History    Marital status:      Spouse name: Not on file    Number of children: Not on file    Years of education: Not on file    Highest education level: Not on file   Occupational History    Not on file   Tobacco Use    Smoking status: Never Smoker    Smokeless tobacco: Never Used   Vaping Use    Vaping Use: Never used   Substance and Sexual Activity    Alcohol use: Never    Drug use: Never    Sexual activity: Not on file   Other Topics Concern    Not on file   Social History Narrative    Not on file     Social Determinants of Health     Financial Resource Strain:     Difficulty of Paying Living Expenses: Not on file   Food Insecurity:     Worried About [address] in the Last Year: Not on file    [address] in the Last Year: Not on file   Transportation Needs:     Lack of Transportation (Medical): Not on file    Lack of Transportation (Non-Medical):  Not on file   Physical Activity:     Days of Exercise per Week: Not on file    Minutes of Exercise per Session: Not on file   Stress:     Feeling of Stress : Not on file   Social Connections:     Frequency of Communication with Friends and Family: Not on file    Frequency of Social Gatherings with Friends and Family: Not on file    Attends [employer]ism Services: Not on file    Active Member of Clubs or Organizations: Not on file    Attends Club or Organization Meetings: Not on file    Marital Status: Not on file   Intimate Partner Violence:     Fear of Current or Ex-Partner: Not on file    Emotionally Abused: Not on file    Physically Abused: Not on file    Sexually Abused: Not on file   Housing Stability:     Unable to Pay for Housing in the Last Year: Not on file    Number of Places Lived in the Last Year: Not on file    Unstable Housing in the Last Year: Not on file       Family History   Problem Relation Age of Onset    Emphysema Father     Coronary Art Dis Sister 61        cabg    Heart Surgery Sister        REVIEW OF SYSTEMS:     CONSTITUTIONAL:  negative for  fevers, chills, sweats and fatigue  HEENT:  negative for  tinnitus, earaches, nasal congestion and epistaxis  RESPIRATORY:  negative for  dry cough, cough with sputum, wheezing and hemoptysis  GASTROINTESTINAL:  negative for nausea, vomiting, diarrhea, constipation, pruritus and jaundice  HEMATOLOGIC/LYMPHATIC:  negative for easy bruising, bleeding, lymphadenopathy and petechiae  ENDOCRINE:  negative for heat intolerance, cold intolerance, tremor, hair loss and diabetic symptoms including neither polyuria nor polydipsia nor blurred vision  MUSCULOSKELETAL:  negative for  myalgias, arthralgias, joint swelling, stiff joints and decreased range of motion  NEUROLOGICAL:  negative for memory problems, speech problems, visual disturbance, dysphagia, weakness and numbness      PHYSICAL EXAM:   CONSTITUTIONAL:  awake, alert, cooperative, no apparent distress, and appears stated age  HEAD:  normocepalic, without obvious abnormality, atraumatic, pink, moist mucous membranes. NECK:  Supple, symmetrical, trachea midline, no adenopathy, thyroid symmetric, not enlarged and no tenderness, skin normal  LUNGS:  No increased work of breathing, good air exchange, clear to auscultation bilaterally, no crackles or wheezing  CARDIOVASCULAR:  Normal apical impulse, regular rate and rhythm, normal S1 and S2, no S3 or S4, and no murmur noted and no JVD, no carotid bruit, no pedal edema, good carotid upstroke bilaterally.   ABDOMEN:  Soft, nontender, no masses, no hepatomegaly or splenomegaly, BS+  CHEST: nontender to palpation, expands symmetrically  MUSCULOSKELETAL:  No clubbing no cyanosis. there is no redness, warmth, or swelling of the joints  full range of motion noted  NEUROLOGIC:  Alert, awake,oriented x3. SKIN:  no bruising or bleeding, normal skin color, texture, turgor and no redness, warmth, or swelling        /68   Pulse 83   Resp 16   Ht 5' 1\" (1.549 m)   Wt 210 lb (95.3 kg)   BMI 39.68 kg/m²     DATA:   I personally reviewed the visit EKG with the following interpretation: Sinus rhythm, right bundle branch block, nonspecific T wave changes    EKG 2/11/21  Sinus rhythm, incomplete right bundle branch block old anterior wall MI age undetermined, no significant changes when compared to previous    ECHO: 1/29/21     Summary   Technically difficult examination. Mild left ventricle hypertrophy. Normal left ventricular systolic function. Visually estimated LVEF is 60-65 %. No wall motion abnormalities. Normal right ventricle structure and function. Stress Test:     Angiography: 1/29/21 IMPRESSION:  1.  95% discrete mid LAD stenosis, status post successful PCI using  drug-eluting stent. 2.  70% tubular proximal OM1 stenosis. 3.  90% mid stenosis in a small nondominant RCA. 4.  Elevated LVEDP at 28 mmHg. 5.  Apical hypokinesis with an ejection fraction of 50%.   Cardiology Labs: BMP:    Lab Results   Component Value Date     01/30/2021    K 4.0 01/30/2021    K 4.2 01/28/2021     01/30/2021    CO2 23 01/30/2021    BUN 7 01/30/2021    CREATININE 0.8 01/30/2021     CMP:    Lab Results   Component Value Date     01/30/2021    K 4.0 01/30/2021    K 4.2 01/28/2021     01/30/2021    CO2 23 01/30/2021    BUN 7 01/30/2021    CREATININE 0.8 01/30/2021    PROT 6.9 01/29/2021     CBC:    Lab Results   Component Value Date    WBC 9.9 11/13/2021    RBC 4.49 11/13/2021    HGB 13.8 11/13/2021    HCT 41.6 11/13/2021    MCV 92.7 11/13/2021    RDW 14.3 11/13/2021     11/13/2021     PT/INR:  No results found for: PTINR  PT/INR Warfarin:  No components found for: PTPATWAR, PTINRWAR  PTT:    Lab Results   Component Value Date    APTT 30.6 01/30/2021     PTT Heparin:  No components found for: APTTHEP  Magnesium:    Lab Results   Component Value Date    MG 2.2 01/30/2021     TSH:    Lab Results   Component Value Date    TSH 2.100 01/29/2021     TROPONIN:  No components found for: TROP  BNP:  No results found for: BNP  FASTING LIPID PANEL:    Lab Results   Component Value Date    CHOL 177 01/29/2021    HDL 62 01/29/2021    TRIG 89 01/29/2021     No orders to display     I have personally reviewed the laboratory, cardiac diagnostic and radiographic testing as outlined above:      IMPRESSION:  1.  CAD: History of non-ST elevation MI s/p cardiac catheterization on 1/29/21 with the following findings:  # 95% discrete mid LAD stenosis, status post successful PCI using drug-eluting stent. # 70% tubular proximal OM1 stenosis. # 90% mid stenosis in a small nondominant RCA. # Elevated LVEDP at 28 mmHg. # Apical hypokinesis with an ejection fraction of 50%. will continue current treatment  2. Abnormal EKG: Incomplete right bundle branch block, old anterior wall MI age undetermined  3. Hyperlipidemia: On statin     RECOMMENDATIONS:   1. Continue current treatment  2. Risk of instent thrombosis due to premature discontinuation of either ASA or Plavix discussed with patient at length  3. Preventive cardiology: Low-salt, low-cholesterol diet, daily exercise, total cholesterol of less than 200, LDL of less than 70, adherence to diabetic diet and diabetic medications, smoking cessation were all advised. 4.  Follow-up with Dr. Tamiko Edwards as scheduled  5.   Follow-up with Dr. Rocio Butcher in 6 months, sooner if symptomatic for any reason    I have reviewed my findings and recommendations with patient    Electronically signed by Jenn Mohan MD on 1/19/2022 at 11:48 AM    NOTE: This report was transcribed using voice recognition software.  Every effort was made to ensure accuracy; however, inadvertent computerized transcription errors may be present

## 2022-01-19 ENCOUNTER — OFFICE VISIT (OUTPATIENT)
Dept: CARDIOLOGY CLINIC | Age: 67
End: 2022-01-19
Payer: MEDICARE

## 2022-01-19 VITALS
RESPIRATION RATE: 16 BRPM | HEIGHT: 61 IN | WEIGHT: 210 LBS | SYSTOLIC BLOOD PRESSURE: 130 MMHG | BODY MASS INDEX: 39.65 KG/M2 | DIASTOLIC BLOOD PRESSURE: 68 MMHG | HEART RATE: 83 BPM

## 2022-01-19 DIAGNOSIS — I25.10 CORONARY ARTERY DISEASE INVOLVING NATIVE CORONARY ARTERY OF NATIVE HEART WITHOUT ANGINA PECTORIS: Primary | ICD-10-CM

## 2022-01-19 PROCEDURE — 93000 ELECTROCARDIOGRAM COMPLETE: CPT | Performed by: INTERNAL MEDICINE

## 2022-01-19 PROCEDURE — 1090F PRES/ABSN URINE INCON ASSESS: CPT | Performed by: INTERNAL MEDICINE

## 2022-01-19 PROCEDURE — G8484 FLU IMMUNIZE NO ADMIN: HCPCS | Performed by: INTERNAL MEDICINE

## 2022-01-19 PROCEDURE — 1036F TOBACCO NON-USER: CPT | Performed by: INTERNAL MEDICINE

## 2022-01-19 PROCEDURE — 4040F PNEUMOC VAC/ADMIN/RCVD: CPT | Performed by: INTERNAL MEDICINE

## 2022-01-19 PROCEDURE — G8427 DOCREV CUR MEDS BY ELIG CLIN: HCPCS | Performed by: INTERNAL MEDICINE

## 2022-01-19 PROCEDURE — 3017F COLORECTAL CA SCREEN DOC REV: CPT | Performed by: INTERNAL MEDICINE

## 2022-01-19 PROCEDURE — 1123F ACP DISCUSS/DSCN MKR DOCD: CPT | Performed by: INTERNAL MEDICINE

## 2022-01-19 PROCEDURE — G8417 CALC BMI ABV UP PARAM F/U: HCPCS | Performed by: INTERNAL MEDICINE

## 2022-01-19 PROCEDURE — G8400 PT W/DXA NO RESULTS DOC: HCPCS | Performed by: INTERNAL MEDICINE

## 2022-01-19 PROCEDURE — 99214 OFFICE O/P EST MOD 30 MIN: CPT | Performed by: INTERNAL MEDICINE

## 2022-05-27 RX ORDER — ATORVASTATIN CALCIUM 40 MG/1
TABLET, FILM COATED ORAL
Qty: 90 TABLET | Refills: 1 | Status: SHIPPED | OUTPATIENT
Start: 2022-05-27

## 2022-05-27 RX ORDER — METOPROLOL SUCCINATE 25 MG/1
25 TABLET, EXTENDED RELEASE ORAL DAILY
Qty: 90 TABLET | Refills: 1 | Status: SHIPPED | OUTPATIENT
Start: 2022-05-27

## 2022-05-27 RX ORDER — CLOPIDOGREL BISULFATE 75 MG/1
75 TABLET ORAL DAILY
Qty: 90 TABLET | Refills: 1 | Status: SHIPPED | OUTPATIENT
Start: 2022-05-27

## 2022-10-06 ENCOUNTER — OFFICE VISIT (OUTPATIENT)
Dept: CARDIOLOGY CLINIC | Age: 67
End: 2022-10-06
Payer: MEDICARE

## 2022-10-06 VITALS
RESPIRATION RATE: 16 BRPM | HEIGHT: 61 IN | BODY MASS INDEX: 40.22 KG/M2 | DIASTOLIC BLOOD PRESSURE: 62 MMHG | SYSTOLIC BLOOD PRESSURE: 124 MMHG | WEIGHT: 213 LBS | HEART RATE: 72 BPM

## 2022-10-06 DIAGNOSIS — I25.10 CAD IN NATIVE ARTERY: Primary | ICD-10-CM

## 2022-10-06 PROCEDURE — G8400 PT W/DXA NO RESULTS DOC: HCPCS | Performed by: INTERNAL MEDICINE

## 2022-10-06 PROCEDURE — 1090F PRES/ABSN URINE INCON ASSESS: CPT | Performed by: INTERNAL MEDICINE

## 2022-10-06 PROCEDURE — 1036F TOBACCO NON-USER: CPT | Performed by: INTERNAL MEDICINE

## 2022-10-06 PROCEDURE — 93000 ELECTROCARDIOGRAM COMPLETE: CPT | Performed by: INTERNAL MEDICINE

## 2022-10-06 PROCEDURE — G8417 CALC BMI ABV UP PARAM F/U: HCPCS | Performed by: INTERNAL MEDICINE

## 2022-10-06 PROCEDURE — G8427 DOCREV CUR MEDS BY ELIG CLIN: HCPCS | Performed by: INTERNAL MEDICINE

## 2022-10-06 PROCEDURE — 99214 OFFICE O/P EST MOD 30 MIN: CPT | Performed by: INTERNAL MEDICINE

## 2022-10-06 PROCEDURE — G8484 FLU IMMUNIZE NO ADMIN: HCPCS | Performed by: INTERNAL MEDICINE

## 2022-10-06 PROCEDURE — 1123F ACP DISCUSS/DSCN MKR DOCD: CPT | Performed by: INTERNAL MEDICINE

## 2022-10-06 PROCEDURE — 3017F COLORECTAL CA SCREEN DOC REV: CPT | Performed by: INTERNAL MEDICINE

## 2022-10-06 NOTE — PROGRESS NOTES
[facility] Cardiology Progress Note  Dr. Colleen Wu      Referring Physician: Paige Caputo MD  CHIEF COMPLAINT:   Chief Complaint   Patient presents with    Coronary Artery Disease     Follow up        HISTORY OF PRESENT ILLNESS:   Patient 69year old female with history of CAD s/p PCI to LAD, hyperlipidemia is here for a follow up visit. Patient denies any chest pain, no shortness of breath, no palpitations, no pedal edema, no PND, no orthopnea, no syncope, no presyncopal episodes. Occasional dizziness after taking metoprolol  Functional capacity is at baseline  Taking care of her sick  with advanced Parkinson's    Past Medical History:   Diagnosis Date    CAD (coronary artery disease)     Cyst of left kidney     x 2    Diverticulitis     Gallbladder problem 01/2020    History of blood transfusion 40 years ago    Hyperlipidemia     Phlebitis 40 years ago    while hospitalized    PONV (postoperative nausea and vomiting)          Past Surgical History:   Procedure Laterality Date    CHOLECYSTECTOMY, LAPAROSCOPIC N/A 2/4/2020    LAPAROSCOPIC ROBOTIC XI ASSISTED CHOLECYSTECTOMY performed by Afsaneh Epstein MD at [address] [place] [address]  01/29/2021    Dr Florie Koyanagi, Placed TRINH resolute yue [id number] in the Mid LAD     [address] [place] [address], [facility]  2012    SPLENECTOMY, TOTAL  1979    TOE SURGERY  2000    TONSILLECTOMY           Current Outpatient Medications   Medication Sig Dispense Refill    atorvastatin (LIPITOR) 40 MG tablet TAKE 1 TABLET BY MOUTH EVERY NIGHT 90 tablet 1    metoprolol succinate (TOPROL XL) 25 MG extended release tablet TAKE 1 TABLET BY MOUTH DAILY 90 tablet 1    clopidogrel (PLAVIX) 75 MG tablet TAKE 1 TABLET BY MOUTH DAILY 90 tablet 1    aspirin 81 MG EC tablet Take 1 tablet by mouth daily (Patient not taking: No sig reported) 30 tablet 3     No current facility-administered medications for this visit. Allergies as of 10/06/2022 - Fully Reviewed 10/06/2022   Allergen Reaction Noted    Latex Itching and Rash 02/03/2020    Aspirin Nausea And Vomiting and Other (See Comments) 02/22/2017    Chloraprep one step [chlorhexidine gluconate] Itching and Rash 02/17/2020       Social History     Socioeconomic History    Marital status:      Spouse name: Not on file    Number of children: Not on file    Years of education: Not on file    Highest education level: Not on file   Occupational History    Not on file   Tobacco Use    Smoking status: Never    Smokeless tobacco: Never   Vaping Use    Vaping Use: Never used   Substance and Sexual Activity    Alcohol use: Never    Drug use: Never    Sexual activity: Not on file   Other Topics Concern    Not on file   Social History Narrative    Not on file     Social Determinants of Health     Financial Resource Strain: Not on file   Food Insecurity: Not on file   Transportation Needs: Not on file   Physical Activity: Not on file   Stress: Not on file   Social Connections: Not on file   Intimate Partner Violence: Not on file   Housing Stability: Not on file       Family History   Problem Relation Age of Onset    Emphysema Father     Coronary Art Dis Sister 61        cabg    Heart Surgery Sister        REVIEW OF SYSTEMS:     CONSTITUTIONAL:  negative for  fevers, chills, sweats and fatigue  HEENT:  negative for  tinnitus, earaches, nasal congestion and epistaxis  RESPIRATORY:  negative for  dry cough, cough with sputum, wheezing and hemoptysis  GASTROINTESTINAL:  negative for nausea, vomiting, diarrhea, constipation, pruritus and jaundice  HEMATOLOGIC/LYMPHATIC:  negative for easy bruising, bleeding, lymphadenopathy and petechiae  ENDOCRINE:  negative for heat intolerance, cold intolerance, tremor, hair loss and diabetic symptoms including neither polyuria nor polydipsia nor blurred vision  MUSCULOSKELETAL:  negative for  myalgias, arthralgias, joint swelling, stiff joints and decreased range of motion  NEUROLOGICAL:  negative for memory problems, speech problems, visual disturbance, dysphagia, weakness and numbness      PHYSICAL EXAM:   CONSTITUTIONAL:  awake, alert, cooperative, no apparent distress, and appears stated age  HEAD:  normocepalic, without obvious abnormality, atraumatic, pink, moist mucous membranes. NECK:  Supple, symmetrical, trachea midline, no adenopathy, thyroid symmetric, not enlarged and no tenderness, skin normal  LUNGS:  No increased work of breathing, good air exchange, clear to auscultation bilaterally, no crackles or wheezing  CARDIOVASCULAR:  Normal apical impulse, regular rate and rhythm, normal S1 and S2, no S3 or S4, and no murmur noted and no JVD, no carotid bruit, no pedal edema, good carotid upstroke bilaterally. ABDOMEN:  Soft, nontender, no masses, no hepatomegaly or splenomegaly, BS+  CHEST: nontender to palpation, expands symmetrically  MUSCULOSKELETAL:  No clubbing no cyanosis. there is no redness, warmth, or swelling of the joints  full range of motion noted  NEUROLOGIC:  Alert, awake,oriented x3. SKIN:  no bruising or bleeding, normal skin color, texture, turgor and no redness, warmth, or swelling        /62   Pulse 72   Resp 16   Ht 5' 1\" (1.549 m)   Wt 213 lb (96.6 kg)   BMI 40.25 kg/m²     DATA:   I personally reviewed the visit EKG with the following interpretation: Sinus rhythm, right bundle branch block, low voltage QRS in the precordial leads, normal axis    EKG 1/19/2022, sinus rhythm, right bundle branch block, nonspecific T wave changes    EKG 2/11/21  Sinus rhythm, incomplete right bundle branch block old anterior wall MI age undetermined, no significant changes when compared to previous    ECHO: 1/29/21     Summary   Technically difficult examination. Mild left ventricle hypertrophy. Normal left ventricular systolic function. Visually estimated LVEF is 60-65 %. No wall motion abnormalities.    Normal right ventricle structure and function. Stress Test:     Angiography: 1/29/21 IMPRESSION:  1.  95% discrete mid LAD stenosis, status post successful PCI using  drug-eluting stent. 2.  70% tubular proximal OM1 stenosis. 3.  90% mid stenosis in a small nondominant RCA. 4.  Elevated LVEDP at 28 mmHg. 5.  Apical hypokinesis with an ejection fraction of 50%.   Cardiology Labs: BMP:    Lab Results   Component Value Date/Time     01/30/2021 05:50 AM    K 4.0 01/30/2021 05:50 AM    K 4.2 01/28/2021 05:00 PM     01/30/2021 05:50 AM    CO2 23 01/30/2021 05:50 AM    BUN 7 01/30/2021 05:50 AM    CREATININE 0.8 01/30/2021 05:50 AM     CMP:    Lab Results   Component Value Date/Time     01/30/2021 05:50 AM    K 4.0 01/30/2021 05:50 AM    K 4.2 01/28/2021 05:00 PM     01/30/2021 05:50 AM    CO2 23 01/30/2021 05:50 AM    BUN 7 01/30/2021 05:50 AM    CREATININE 0.8 01/30/2021 05:50 AM    PROT 6.9 01/29/2021 06:39 AM     CBC:    Lab Results   Component Value Date/Time    WBC 9.9 11/13/2021 06:52 AM    RBC 4.49 11/13/2021 06:52 AM    HGB 13.8 11/13/2021 06:52 AM    HCT 41.6 11/13/2021 06:52 AM    MCV 92.7 11/13/2021 06:52 AM    RDW 14.3 11/13/2021 06:52 AM     11/13/2021 06:52 AM     PT/INR:  No results found for: PTINR  PT/INR Warfarin:  No components found for: PTPATWAR, PTINRWAR  PTT:    Lab Results   Component Value Date/Time    APTT 30.6 01/30/2021 05:50 AM     PTT Heparin:  No components found for: APTTHEP  Magnesium:    Lab Results   Component Value Date/Time    MG 2.2 01/30/2021 05:50 AM     TSH:    Lab Results   Component Value Date/Time    TSH 2.100 01/29/2021 06:39 AM     TROPONIN:  No components found for: TROP  BNP:  No results found for: BNP  FASTING LIPID PANEL:    Lab Results   Component Value Date/Time    CHOL 177 01/29/2021 06:39 AM    HDL 62 01/29/2021 06:39 AM    TRIG 89 01/29/2021 06:39 AM     No orders to display     I have personally reviewed the laboratory, cardiac diagnostic and

## 2022-11-28 RX ORDER — CLOPIDOGREL BISULFATE 75 MG/1
75 TABLET ORAL DAILY
Qty: 90 TABLET | Refills: 1 | Status: SHIPPED | OUTPATIENT
Start: 2022-11-28

## 2022-11-28 RX ORDER — METOPROLOL SUCCINATE 25 MG/1
25 TABLET, EXTENDED RELEASE ORAL DAILY
Qty: 90 TABLET | Refills: 1 | Status: SHIPPED | OUTPATIENT
Start: 2022-11-28

## 2022-11-28 RX ORDER — ATORVASTATIN CALCIUM 40 MG/1
TABLET, FILM COATED ORAL
Qty: 90 TABLET | Refills: 1 | Status: SHIPPED | OUTPATIENT
Start: 2022-11-28

## 2023-08-01 RX ORDER — CLOPIDOGREL BISULFATE 75 MG/1
75 TABLET ORAL DAILY
Qty: 90 TABLET | Refills: 1 | Status: SHIPPED | OUTPATIENT
Start: 2023-08-01

## 2023-10-24 ENCOUNTER — OFFICE VISIT (OUTPATIENT)
Dept: CARDIOLOGY CLINIC | Age: 68
End: 2023-10-24

## 2023-10-24 VITALS
DIASTOLIC BLOOD PRESSURE: 62 MMHG | RESPIRATION RATE: 16 BRPM | HEART RATE: 77 BPM | HEIGHT: 61 IN | WEIGHT: 211 LBS | BODY MASS INDEX: 39.84 KG/M2 | SYSTOLIC BLOOD PRESSURE: 104 MMHG

## 2023-10-24 DIAGNOSIS — I25.10 CAD IN NATIVE ARTERY: Primary | ICD-10-CM

## 2023-10-24 NOTE — PROGRESS NOTES
diagnostic and radiographic testing as outlined above:      IMPRESSION:  1.  CAD: History of non-ST elevation MI s/p cardiac catheterization on 1/29/21 with the following findings:  # 95% discrete mid LAD stenosis, status post successful PCI using drug-eluting stent. # 70% tubular proximal OM1 stenosis. # 90% mid stenosis in a small nondominant RCA. # Elevated LVEDP at 28 mmHg. # Apical hypokinesis with an ejection fraction of 50%. will continue current treatment  2. Abnormal EKG: right bundle branch block, old anterior wall MI age undetermined  3. Hyperlipidemia: On statin     RECOMMENDATIONS:   1. Continue current treatment  2. Preventive cardiology: Low-salt, low-cholesterol diet, daily exercise, total cholesterol of less than 200, LDL of less than 70, were all advised. 3.  Follow-up with Dr. Shruthi Doshi as scheduled  4. Follow-up with Dr. Antonia Garcia in 9 months, sooner if symptomatic for any reason    I have reviewed my findings and recommendations with patient    Electronically signed by Ben Smalls MD on 10/24/2023 at 1:07 PM    NOTE: This report was transcribed using voice recognition software.  Every effort was made to ensure accuracy; however, inadvertent computerized transcription errors may be present

## 2024-01-04 RX ORDER — CLOPIDOGREL BISULFATE 75 MG/1
75 TABLET ORAL DAILY
Qty: 90 TABLET | Refills: 1 | Status: SHIPPED | OUTPATIENT
Start: 2024-01-04

## 2024-01-04 RX ORDER — METOPROLOL SUCCINATE 25 MG/1
25 TABLET, EXTENDED RELEASE ORAL DAILY
Qty: 90 TABLET | Refills: 1 | Status: SHIPPED | OUTPATIENT
Start: 2024-01-04

## 2024-08-09 RX ORDER — CLOPIDOGREL BISULFATE 75 MG/1
75 TABLET ORAL DAILY
Qty: 90 TABLET | Refills: 1 | Status: SHIPPED | OUTPATIENT
Start: 2024-08-09

## 2024-11-25 RX ORDER — METOPROLOL SUCCINATE 25 MG/1
25 TABLET, EXTENDED RELEASE ORAL DAILY
Qty: 90 TABLET | Refills: 1 | OUTPATIENT
Start: 2024-11-25

## 2025-02-13 ENCOUNTER — TELEPHONE (OUTPATIENT)
Dept: CARDIOLOGY CLINIC | Age: 70
End: 2025-02-13

## 2025-02-14 ENCOUNTER — TELEPHONE (OUTPATIENT)
Dept: CARDIOLOGY CLINIC | Age: 70
End: 2025-02-14

## 2025-02-18 ENCOUNTER — OFFICE VISIT (OUTPATIENT)
Dept: CARDIOLOGY CLINIC | Age: 70
End: 2025-02-18

## 2025-02-18 VITALS
WEIGHT: 193.6 LBS | SYSTOLIC BLOOD PRESSURE: 132 MMHG | BODY MASS INDEX: 36.55 KG/M2 | RESPIRATION RATE: 16 BRPM | HEIGHT: 61 IN | DIASTOLIC BLOOD PRESSURE: 60 MMHG | HEART RATE: 67 BPM

## 2025-02-18 DIAGNOSIS — I25.10 CAD IN NATIVE ARTERY: Primary | ICD-10-CM

## 2025-02-18 NOTE — PROGRESS NOTES
joints and decreased range of motion  NEUROLOGICAL:  negative for memory problems, speech problems, visual disturbance, dysphagia, weakness and numbness      PHYSICAL EXAM:   CONSTITUTIONAL:  awake, alert, cooperative, no apparent distress, and appears stated age  HEAD:  normocepalic, without obvious abnormality, atraumatic, pink, moist mucous membranes.  NECK:  Supple, symmetrical, trachea midline, no adenopathy, thyroid symmetric, not enlarged and no tenderness, skin normal  LUNGS:  No increased work of breathing, good air exchange, clear to auscultation bilaterally, no crackles or wheezing  CARDIOVASCULAR:  Normal apical impulse, regular rate and rhythm, normal S1 and S2, no S3 or S4, and no murmur noted and no JVD, no carotid bruit, no pedal edema, good carotid upstroke bilaterally.  ABDOMEN:  Soft, nontender, no masses, no hepatomegaly or splenomegaly, BS+  CHEST: nontender to palpation, expands symmetrically  MUSCULOSKELETAL:  No clubbing no cyanosis.there is no redness, warmth, or swelling of the joints  full range of motion noted  NEUROLOGIC:  Alert, awake,oriented x3.  SKIN:  no bruising or bleeding, normal skin color, texture, turgor and no redness, warmth, or swelling        /60   Pulse 67   Resp 16   Ht 1.549 m (5' 1\")   Wt 87.8 kg (193 lb 9.6 oz)   BMI 36.58 kg/m²     DATA:   I personally reviewed the visit EKG with the following interpretation: Sinus rhythm, incomplete left poor R wave progression, low voltage QRS in the precordial leads left axis deviation.    10/14/2023, sinus rhythm, right bundle branch block, low voltage QRS in the precordial leads, normal axis    EKG 1/19/2022, sinus rhythm, right bundle branch block, nonspecific T wave changes    EKG 2/11/21  Sinus rhythm, incomplete right bundle branch block old anterior wall MI age undetermined, no significant changes when compared to previous    ECHO: 1/29/21     Summary   Technically difficult examination.   Mild left ventricle

## 2025-02-24 RX ORDER — METOPROLOL SUCCINATE 25 MG/1
25 TABLET, EXTENDED RELEASE ORAL DAILY
Qty: 90 TABLET | Refills: 1 | Status: SHIPPED | OUTPATIENT
Start: 2025-02-24

## 2025-02-24 RX ORDER — CLOPIDOGREL BISULFATE 75 MG/1
75 TABLET ORAL DAILY
Qty: 90 TABLET | Refills: 1 | Status: SHIPPED | OUTPATIENT
Start: 2025-02-24

## 2025-03-11 RX ORDER — CLOPIDOGREL BISULFATE 75 MG/1
75 TABLET ORAL DAILY
Qty: 90 TABLET | Refills: 1 | Status: SHIPPED | OUTPATIENT
Start: 2025-03-11

## 2025-06-11 ENCOUNTER — TELEPHONE (OUTPATIENT)
Dept: CARDIOLOGY CLINIC | Age: 70
End: 2025-06-11

## 2025-06-11 NOTE — TELEPHONE ENCOUNTER
Patient asking for lab work to be ordered. She needs Dental work and they are requesting CBC be drawn. She does not have a family

## 2025-06-16 DIAGNOSIS — I25.10 CAD IN NATIVE ARTERY: Primary | ICD-10-CM

## 2025-06-24 ENCOUNTER — HOSPITAL ENCOUNTER (OUTPATIENT)
Age: 70
Discharge: HOME | End: 2025-06-24
Payer: MEDICARE

## 2025-06-24 DIAGNOSIS — I25.10 CAD IN NATIVE ARTERY: ICD-10-CM

## 2025-06-24 LAB
ERYTHROCYTE [DISTWIDTH] IN BLOOD BY AUTOMATED COUNT: 14.1 % (ref 11.5–15)
HCT VFR BLD AUTO: 43.3 % (ref 34–48)
HGB BLD-MCNC: 14.7 G/DL (ref 11.5–15.5)
MCH RBC QN AUTO: 30.8 PG (ref 26–35)
MCHC RBC AUTO-ENTMCNC: 33.9 G/DL (ref 32–34.5)
MCV RBC AUTO: 90.8 FL (ref 80–99.9)
PLATELET # BLD AUTO: 386 K/UL (ref 130–450)
PMV BLD AUTO: 9.7 FL (ref 7–12)
RBC # BLD AUTO: 4.77 M/UL (ref 3.5–5.5)
WBC OTHER # BLD: 8.4 K/UL (ref 4.5–11.5)

## 2025-06-24 PROCEDURE — 36415 COLL VENOUS BLD VENIPUNCTURE: CPT

## 2025-06-24 PROCEDURE — 85027 COMPLETE CBC AUTOMATED: CPT

## 2025-07-03 ENCOUNTER — TELEPHONE (OUTPATIENT)
Dept: CARDIOLOGY CLINIC | Age: 70
End: 2025-07-03

## (undated) DEVICE — DRAPE,LAP,CHOLE,W/TROUGHS,STERILE: Brand: MEDLINE

## (undated) DEVICE — TOWEL,OR,DSP,ST,BLUE,STD,6/PK,12PK/CS: Brand: MEDLINE

## (undated) DEVICE — WARMER SCP LAP

## (undated) DEVICE — GOWN,SIRUS,FABRNF,L,20/CS: Brand: MEDLINE

## (undated) DEVICE — TIP COVER ACCESSORY

## (undated) DEVICE — KIT,ANTI FOG,W/SPONGE & FLUID,SOFT PACK: Brand: MEDLINE

## (undated) DEVICE — CANNULA SEAL

## (undated) DEVICE — ARM DRAPE

## (undated) DEVICE — ELECTRODE PT RET AD L9FT HI MOIST COND ADH HYDRGEL CORDED

## (undated) DEVICE — DOUBLE BASIN SET: Brand: MEDLINE INDUSTRIES, INC.

## (undated) DEVICE — Z INACTIVE USE 2660664 SOLUTION IRRIG 3000ML 0.9% SOD CHL USP UROMATIC PLAS CONT

## (undated) DEVICE — ANCHOR TISSUE RETRIEVAL SYSTEM, BAG SIZE 125 ML, PORT SIZE 8 MM: Brand: ANCHOR TISSUE RETRIEVAL SYSTEM

## (undated) DEVICE — INTENDED FOR TISSUE SEPARATION, AND OTHER PROCEDURES THAT REQUIRE A SHARP SURGICAL BLADE TO PUNCTURE OR CUT.: Brand: BARD-PARKER ® STAINLESS STEEL BLADES

## (undated) DEVICE — Z INACTIVE USE 2641839 CLIP INT M L POLYMER LOK LIG HEM O LOK

## (undated) DEVICE — NEEDLE CLOSURE OMNICLOSE

## (undated) DEVICE — COLUMN DRAPE

## (undated) DEVICE — INSUFFLATION NEEDLE TO ESTABLISH PNEUMOPERITONEUM.: Brand: INSUFFLATION NEEDLE

## (undated) DEVICE — PACK PROCEDURE SURG GEN CUST

## (undated) DEVICE — SUCTION IRRIGATOR: Brand: ENDOWRIST

## (undated) DEVICE — LARGE HEM-O-LOK CLIP APPLIER: Brand: ENDOWRIST

## (undated) DEVICE — CADIERE FORCEPS: Brand: ENDOWRIST

## (undated) DEVICE — SYRINGE 20ML LL S/C 50

## (undated) DEVICE — CHLORAPREP 26ML ORANGE

## (undated) DEVICE — PERMANENT CAUTERY HOOK: Brand: ENDOWRIST

## (undated) DEVICE — INSUFFLATION TUBING SET WITH FILTER, FUNNEL CONNECTOR AND LUER LOCK: Brand: JOSNOE MEDICAL INC

## (undated) DEVICE — Z DUP USE 2257490 ADHESIVE SKIN CLSRE 036ML TPCL 2CTL CNCRLTE HIGH VSCSTY DRMB

## (undated) DEVICE — MEDIUM-LARGE CLIP APPLIER: Brand: ENDOWRIST

## (undated) DEVICE — CURVED SCISSORS: Brand: ENDOWRIST;DAVINCI SI

## (undated) DEVICE — BLADELESS OBTURATOR: Brand: WECK VISTA